# Patient Record
Sex: FEMALE | Race: WHITE | NOT HISPANIC OR LATINO | URBAN - METROPOLITAN AREA
[De-identification: names, ages, dates, MRNs, and addresses within clinical notes are randomized per-mention and may not be internally consistent; named-entity substitution may affect disease eponyms.]

---

## 2017-07-10 ENCOUNTER — INPATIENT (INPATIENT)
Facility: HOSPITAL | Age: 82
LOS: 2 days | Discharge: HOSPICE | DRG: 177 | End: 2017-07-13
Attending: INTERNAL MEDICINE | Admitting: HOSPITALIST
Payer: MEDICARE

## 2017-07-10 VITALS
RESPIRATION RATE: 16 BRPM | TEMPERATURE: 98 F | SYSTOLIC BLOOD PRESSURE: 108 MMHG | HEIGHT: 64 IN | WEIGHT: 110.01 LBS | DIASTOLIC BLOOD PRESSURE: 70 MMHG | HEART RATE: 80 BPM | OXYGEN SATURATION: 94 %

## 2017-07-10 DIAGNOSIS — K52.9 NONINFECTIVE GASTROENTERITIS AND COLITIS, UNSPECIFIED: ICD-10-CM

## 2017-07-10 DIAGNOSIS — J15.9 UNSPECIFIED BACTERIAL PNEUMONIA: ICD-10-CM

## 2017-07-10 DIAGNOSIS — L89.154 PRESSURE ULCER OF SACRAL REGION, STAGE 4: ICD-10-CM

## 2017-07-10 DIAGNOSIS — N30.00 ACUTE CYSTITIS WITHOUT HEMATURIA: ICD-10-CM

## 2017-07-10 DIAGNOSIS — L98.8 OTHER SPECIFIED DISORDERS OF THE SKIN AND SUBCUTANEOUS TISSUE: ICD-10-CM

## 2017-07-10 DIAGNOSIS — K63.2 FISTULA OF INTESTINE: ICD-10-CM

## 2017-07-10 DIAGNOSIS — D47.3 ESSENTIAL (HEMORRHAGIC) THROMBOCYTHEMIA: ICD-10-CM

## 2017-07-10 DIAGNOSIS — E87.1 HYPO-OSMOLALITY AND HYPONATREMIA: ICD-10-CM

## 2017-07-10 DIAGNOSIS — Z93.1 GASTROSTOMY STATUS: Chronic | ICD-10-CM

## 2017-07-10 DIAGNOSIS — Z29.9 ENCOUNTER FOR PROPHYLACTIC MEASURES, UNSPECIFIED: ICD-10-CM

## 2017-07-10 DIAGNOSIS — K56.41 FECAL IMPACTION: ICD-10-CM

## 2017-07-10 LAB
ALBUMIN SERPL ELPH-MCNC: 2.5 G/DL — LOW (ref 3.3–5)
ALP SERPL-CCNC: 445 U/L — HIGH (ref 40–120)
ALT FLD-CCNC: 85 U/L — HIGH (ref 12–78)
ANION GAP SERPL CALC-SCNC: 5 MMOL/L — SIGNIFICANT CHANGE UP (ref 5–17)
APPEARANCE UR: ABNORMAL
APTT BLD: 30.8 SEC — SIGNIFICANT CHANGE UP (ref 27.5–37.4)
AST SERPL-CCNC: 97 U/L — HIGH (ref 15–37)
BASOPHILS # BLD AUTO: 0 K/UL — SIGNIFICANT CHANGE UP (ref 0–0.2)
BASOPHILS NFR BLD AUTO: 0.2 % — SIGNIFICANT CHANGE UP (ref 0–2)
BILIRUB SERPL-MCNC: 0.3 MG/DL — SIGNIFICANT CHANGE UP (ref 0.2–1.2)
BILIRUB UR-MCNC: NEGATIVE — SIGNIFICANT CHANGE UP
BUN SERPL-MCNC: 44 MG/DL — HIGH (ref 7–23)
CALCIUM SERPL-MCNC: 8.9 MG/DL — SIGNIFICANT CHANGE UP (ref 8.5–10.1)
CHLORIDE SERPL-SCNC: 93 MMOL/L — LOW (ref 96–108)
CO2 SERPL-SCNC: 31 MMOL/L — SIGNIFICANT CHANGE UP (ref 22–31)
COLOR SPEC: ABNORMAL
CREAT SERPL-MCNC: 0.48 MG/DL — LOW (ref 0.5–1.3)
DIFF PNL FLD: ABNORMAL
EOSINOPHIL # BLD AUTO: 0 K/UL — SIGNIFICANT CHANGE UP (ref 0–0.5)
EOSINOPHIL NFR BLD AUTO: 0.1 % — SIGNIFICANT CHANGE UP (ref 0–6)
GLUCOSE SERPL-MCNC: 85 MG/DL — SIGNIFICANT CHANGE UP (ref 70–99)
GLUCOSE UR QL: NEGATIVE — SIGNIFICANT CHANGE UP
HCT VFR BLD CALC: 33.1 % — LOW (ref 34.5–45)
HGB BLD-MCNC: 11.3 G/DL — LOW (ref 11.5–15.5)
INR BLD: 1.14 RATIO — SIGNIFICANT CHANGE UP (ref 0.88–1.16)
KETONES UR-MCNC: NEGATIVE — SIGNIFICANT CHANGE UP
LACTATE SERPL-SCNC: 0.8 MMOL/L — SIGNIFICANT CHANGE UP (ref 0.7–2)
LEUKOCYTE ESTERASE UR-ACNC: ABNORMAL
LIDOCAIN IGE QN: 82 U/L — SIGNIFICANT CHANGE UP (ref 73–393)
LYMPHOCYTES # BLD AUTO: 1 K/UL — SIGNIFICANT CHANGE UP (ref 1–3.3)
LYMPHOCYTES # BLD AUTO: 13.7 % — SIGNIFICANT CHANGE UP (ref 13–44)
MCHC RBC-ENTMCNC: 32 PG — SIGNIFICANT CHANGE UP (ref 27–34)
MCHC RBC-ENTMCNC: 34.2 GM/DL — SIGNIFICANT CHANGE UP (ref 32–36)
MCV RBC AUTO: 93.8 FL — SIGNIFICANT CHANGE UP (ref 80–100)
MONOCYTES # BLD AUTO: 0.4 K/UL — SIGNIFICANT CHANGE UP (ref 0–0.9)
MONOCYTES NFR BLD AUTO: 5.8 % — SIGNIFICANT CHANGE UP (ref 1–9)
NEUTROPHILS # BLD AUTO: 5.7 K/UL — SIGNIFICANT CHANGE UP (ref 1.8–7.4)
NEUTROPHILS NFR BLD AUTO: 80.1 % — HIGH (ref 43–77)
NITRITE UR-MCNC: NEGATIVE — SIGNIFICANT CHANGE UP
PH UR: 9 — HIGH (ref 5–8)
PLATELET # BLD AUTO: 407 K/UL — HIGH (ref 150–400)
POTASSIUM SERPL-MCNC: 4.1 MMOL/L — SIGNIFICANT CHANGE UP (ref 3.5–5.3)
POTASSIUM SERPL-SCNC: 4.1 MMOL/L — SIGNIFICANT CHANGE UP (ref 3.5–5.3)
PROT SERPL-MCNC: 7.3 G/DL — SIGNIFICANT CHANGE UP (ref 6–8.3)
PROT UR-MCNC: 500 MG/DL
PROTHROM AB SERPL-ACNC: 12.5 SEC — SIGNIFICANT CHANGE UP (ref 9.8–12.7)
RBC # BLD: 3.53 M/UL — LOW (ref 3.8–5.2)
RBC # FLD: 12.3 % — SIGNIFICANT CHANGE UP (ref 10.3–14.5)
SODIUM SERPL-SCNC: 129 MMOL/L — LOW (ref 135–145)
SP GR SPEC: 1.02 — SIGNIFICANT CHANGE UP (ref 1.01–1.02)
UROBILINOGEN FLD QL: NEGATIVE — SIGNIFICANT CHANGE UP
WBC # BLD: 7.2 K/UL — SIGNIFICANT CHANGE UP (ref 3.8–10.5)
WBC # FLD AUTO: 7.2 K/UL — SIGNIFICANT CHANGE UP (ref 3.8–10.5)

## 2017-07-10 PROCEDURE — 99285 EMERGENCY DEPT VISIT HI MDM: CPT

## 2017-07-10 PROCEDURE — 74176 CT ABD & PELVIS W/O CONTRAST: CPT | Mod: 26

## 2017-07-10 PROCEDURE — 93010 ELECTROCARDIOGRAM REPORT: CPT

## 2017-07-10 PROCEDURE — 71010: CPT | Mod: 26

## 2017-07-10 PROCEDURE — 99223 1ST HOSP IP/OBS HIGH 75: CPT | Mod: AI

## 2017-07-10 RX ORDER — AMLODIPINE BESYLATE 2.5 MG/1
5 TABLET ORAL DAILY
Qty: 0 | Refills: 0 | Status: DISCONTINUED | OUTPATIENT
Start: 2017-07-10 | End: 2017-07-13

## 2017-07-10 RX ORDER — CARBIDOPA AND LEVODOPA 25; 100 MG/1; MG/1
1 TABLET ORAL
Qty: 0 | Refills: 0 | Status: DISCONTINUED | OUTPATIENT
Start: 2017-07-10 | End: 2017-07-13

## 2017-07-10 RX ORDER — VANCOMYCIN HCL 1 G
VIAL (EA) INTRAVENOUS
Qty: 0 | Refills: 0 | Status: DISCONTINUED | OUTPATIENT
Start: 2017-07-10 | End: 2017-07-12

## 2017-07-10 RX ORDER — VANCOMYCIN HCL 1 G
1000 VIAL (EA) INTRAVENOUS ONCE
Qty: 0 | Refills: 0 | Status: COMPLETED | OUTPATIENT
Start: 2017-07-10 | End: 2017-07-10

## 2017-07-10 RX ORDER — OXYCODONE HYDROCHLORIDE 5 MG/1
5 TABLET ORAL EVERY 4 HOURS
Qty: 0 | Refills: 0 | Status: DISCONTINUED | OUTPATIENT
Start: 2017-07-10 | End: 2017-07-13

## 2017-07-10 RX ORDER — VANCOMYCIN HCL 1 G
1000 VIAL (EA) INTRAVENOUS EVERY 12 HOURS
Qty: 0 | Refills: 0 | Status: DISCONTINUED | OUTPATIENT
Start: 2017-07-11 | End: 2017-07-12

## 2017-07-10 RX ORDER — PIPERACILLIN AND TAZOBACTAM 4; .5 G/20ML; G/20ML
3.38 INJECTION, POWDER, LYOPHILIZED, FOR SOLUTION INTRAVENOUS ONCE
Qty: 0 | Refills: 0 | Status: COMPLETED | OUTPATIENT
Start: 2017-07-10 | End: 2017-07-10

## 2017-07-10 RX ORDER — PANTOPRAZOLE SODIUM 20 MG/1
40 TABLET, DELAYED RELEASE ORAL DAILY
Qty: 0 | Refills: 0 | Status: DISCONTINUED | OUTPATIENT
Start: 2017-07-10 | End: 2017-07-13

## 2017-07-10 RX ORDER — FERROUS SULFATE 325(65) MG
300 TABLET ORAL DAILY
Qty: 0 | Refills: 0 | Status: DISCONTINUED | OUTPATIENT
Start: 2017-07-10 | End: 2017-07-13

## 2017-07-10 RX ORDER — PIPERACILLIN AND TAZOBACTAM 4; .5 G/20ML; G/20ML
3.38 INJECTION, POWDER, LYOPHILIZED, FOR SOLUTION INTRAVENOUS EVERY 8 HOURS
Qty: 0 | Refills: 0 | Status: DISCONTINUED | OUTPATIENT
Start: 2017-07-10 | End: 2017-07-13

## 2017-07-10 RX ORDER — SODIUM CHLORIDE 9 MG/ML
1000 INJECTION INTRAMUSCULAR; INTRAVENOUS; SUBCUTANEOUS
Qty: 0 | Refills: 0 | Status: DISCONTINUED | OUTPATIENT
Start: 2017-07-10 | End: 2017-07-12

## 2017-07-10 RX ORDER — ASPIRIN/CALCIUM CARB/MAGNESIUM 324 MG
81 TABLET ORAL DAILY
Qty: 0 | Refills: 0 | Status: DISCONTINUED | OUTPATIENT
Start: 2017-07-10 | End: 2017-07-13

## 2017-07-10 RX ORDER — LEVOTHYROXINE SODIUM 125 MCG
50 TABLET ORAL DAILY
Qty: 0 | Refills: 0 | Status: DISCONTINUED | OUTPATIENT
Start: 2017-07-10 | End: 2017-07-13

## 2017-07-10 RX ORDER — CEFTRIAXONE 500 MG/1
1 INJECTION, POWDER, FOR SOLUTION INTRAMUSCULAR; INTRAVENOUS ONCE
Qty: 0 | Refills: 0 | Status: COMPLETED | OUTPATIENT
Start: 2017-07-10 | End: 2017-07-10

## 2017-07-10 RX ADMIN — PIPERACILLIN AND TAZOBACTAM 200 GRAM(S): 4; .5 INJECTION, POWDER, LYOPHILIZED, FOR SOLUTION INTRAVENOUS at 23:01

## 2017-07-10 RX ADMIN — CEFTRIAXONE 100 GRAM(S): 500 INJECTION, POWDER, FOR SOLUTION INTRAMUSCULAR; INTRAVENOUS at 19:47

## 2017-07-10 RX ADMIN — Medication 250 MILLIGRAM(S): at 23:48

## 2017-07-10 RX ADMIN — SODIUM CHLORIDE 100 MILLILITER(S): 9 INJECTION INTRAMUSCULAR; INTRAVENOUS; SUBCUTANEOUS at 23:02

## 2017-07-10 NOTE — ED ADULT NURSE NOTE - PMH
Aphasia    Cerebral infarction    Dysarthria    Dysphagia    Gastrostomy in place    Hemiparesis    Hemiplegia    HTN (hypertension)    Hypothyroidism    Parkinson disease

## 2017-07-10 NOTE — ED PROVIDER NOTE - OBJECTIVE STATEMENT
80 yo female hx of aphasia/CVA, g-tube, parkinson's sent from [facility] to r/o colon/bladder fistula as per facility note.  Urine was obtained at facility via straight cath, color was dark olive green color with the smell of feces.  Sent here for CT a/p.  Full code as per MOLST form.  PMD at facility Dr. Ace.

## 2017-07-10 NOTE — H&P ADULT - ASSESSMENT
80 YO female with mult comorbidities sent to ED from SNF for fecoid urine. CT with suspected PNA, impaction, colitis, and uretovesicular fistula.

## 2017-07-10 NOTE — ED PROVIDER NOTE - CARE PLAN
Principal Discharge DX:	Entero-colic fistula  Secondary Diagnosis:	Urinary tract infection without hematuria, site unspecified

## 2017-07-10 NOTE — H&P ADULT - HISTORY OF PRESENT ILLNESS
80 YO F PMH Parkinsons, dysphagia, PEG, stage IV decubitus, sent to ED from [facility] for stool visualized in urine. Pt aphasic/non verbal, records from ED and SNF reviewed and family called ( awaiting callback).  In ED, CT ABD pelvis perfomed and interpreted with findings of bilar infiltrates, fecal impaction, colitis and fistula within bladder.

## 2017-07-10 NOTE — H&P ADULT - ATTENDING COMMENTS
seen examined and hP authored by attending  MD in Emergency dept. Attempted to contact pt  Mr Frank Cespedes, with no success. Will continue to try to locate additional numbers from SNF.     CORDELL reviewed, full resuscitation. seen examined and hP authored by attending  MD in Emergency dept. Attempted to contact pt  Mr Frank Salazar, with no success. Will continue to try to locate additional numbers from SNF.     Addendum: spoke to pt son Denys Salazar ( Frank Monteiro IV), voicing understanding and agreement.     CORDELL reviewed, full resuscitation. seen examined and hP authored by attending  MD in Emergency dept.    spoke to pt son Denys Salazar ( Frank Monteiro IV), and  Frank Salazar III via telephone. Both voicing understanding and agreement.     CORDELL reviewed, full resuscitation.

## 2017-07-10 NOTE — H&P ADULT - NSHPPHYSICALEXAM_GEN_ALL_CORE
General: Frail elderly female , NAD  HEENT: NCAT, PERRLA, EOMI bl, moist mucous membranes   Neck: Supple, nontender, no mass  Neurology: awake nion verbal unable to follow commands  Respiratory: rhonchi RLB  CV: RRR, +S1/S2, no murmurs, rubs or gallops  Abdominal: Soft, PEG in place, nursing present as chaperone for rectal exam: good tone, moderate green stool, disimpacted large volume. sent for occult blood.   : zamorano with 100 cc brown urine with foul smell neg SPT or CVA tenderness  Extremities:contracted no edema  Skin: warm, dry, full thickness ulcer unstageable to Right superior buttocks just lateral to superior portion of sacrum. measures 3-4CM/

## 2017-07-10 NOTE — H&P ADULT - NSHPLABSRESULTS_GEN_ALL_CORE
CT ABD There is bibasilar airspace consolidation, which may reflect pneumonia   in the appropriate clinical setting.  Poorly defined centrilobular opacities are noted at the right lung base,   likely reflecting smallairway disease.ercutaneous gastrostomy tube is located within the stomach.  The evaluation of the stomach is limited, nondistended.  There is fecal impaction with distention of the rectosigmoid colon. There   is circumferential bowel wall thickening. Findings may be associated with   a stercoral colitis.  Nosmall bowel bowel obstruction is noted.    No localized intra-abdominal fluid collection or pneumoperitoneum is   noted.    Streak artifact from patient's left hip arthroplasty degrades image   quality limiting evaluation of the pelvis.    The uterusappears displaced to the right pelvis.  There is an approximately 3 cm right adnexal cyst.    There are multilevel degenerative changes of the lumbar spine with a   scoliosis convex curvature to the right.  There are degenerative changes at the righthip joint.    Impression:    Fecal impaction with thick-walled distended rectosigmoid colon, correlate   for stercoral colitis.    Large amount of intraluminal air within the thickened urinary bladder,   correlate for an emphysematous cystitis or enteric fistula formation.                            11.3   7.2   )-----------( 407      ( 10 Jul 2017 18:20 )             33.1       07-10    129<L>  |  93<L>  |  44<H>  ----------------------------<  85  4.1   |  31  |  0.48<L>    Ca    8.9      10 Jul 2017 18:20    TPro  7.3  /  Alb  2.5<L>  /  TBili  0.3  /  DBili  x   /  AST  97<H>  /  ALT  85<H>  /  AlkPhos  445<H>  07-10              Urinalysis Basic - ( 10 Jul 2017 18:20 )    Color: Green / Appearance: Turbid / S.020 / pH: x  Gluc: x / Ketone: Negative  / Bili: Negative / Urobili: Negative   Blood: x / Protein: 500 mg/dL / Nitrite: Negative   Leuk Esterase: Small / RBC: 11-25 /HPF / WBC 11-25   Sq Epi: x / Non Sq Epi: x / Bacteria: Moderate        PT/INR - ( 10 Jul 2017 18:20 )   PT: 12.5 sec;   INR: 1.14 ratio         PTT - ( 10 Jul 2017 18:20 )  PTT:30.8 sec    Lactate Trend  07-10 @ 18:24 Lactate:0.8

## 2017-07-11 DIAGNOSIS — I69.354 HEMIPLEGIA AND HEMIPARESIS FOLLOWING CEREBRAL INFARCTION AFFECTING LEFT NON-DOMINANT SIDE: ICD-10-CM

## 2017-07-11 DIAGNOSIS — A41.9 SEPSIS, UNSPECIFIED ORGANISM: ICD-10-CM

## 2017-07-11 DIAGNOSIS — G93.41 METABOLIC ENCEPHALOPATHY: ICD-10-CM

## 2017-07-11 DIAGNOSIS — D62 ACUTE POSTHEMORRHAGIC ANEMIA: ICD-10-CM

## 2017-07-11 DIAGNOSIS — E87.0 HYPEROSMOLALITY AND HYPERNATREMIA: ICD-10-CM

## 2017-07-11 DIAGNOSIS — Z79.891 LONG TERM (CURRENT) USE OF OPIATE ANALGESIC: ICD-10-CM

## 2017-07-11 DIAGNOSIS — Z93.1 GASTROSTOMY STATUS: ICD-10-CM

## 2017-07-11 DIAGNOSIS — K29.71 GASTRITIS, UNSPECIFIED, WITH BLEEDING: ICD-10-CM

## 2017-07-11 DIAGNOSIS — E87.6 HYPOKALEMIA: ICD-10-CM

## 2017-07-11 DIAGNOSIS — E86.0 DEHYDRATION: ICD-10-CM

## 2017-07-11 DIAGNOSIS — K94.23 GASTROSTOMY MALFUNCTION: ICD-10-CM

## 2017-07-11 DIAGNOSIS — I10 ESSENTIAL (PRIMARY) HYPERTENSION: ICD-10-CM

## 2017-07-11 DIAGNOSIS — E03.9 HYPOTHYROIDISM, UNSPECIFIED: ICD-10-CM

## 2017-07-11 DIAGNOSIS — K63.2 FISTULA OF INTESTINE: ICD-10-CM

## 2017-07-11 DIAGNOSIS — K62.89 OTHER SPECIFIED DISEASES OF ANUS AND RECTUM: ICD-10-CM

## 2017-07-11 DIAGNOSIS — Z79.82 LONG TERM (CURRENT) USE OF ASPIRIN: ICD-10-CM

## 2017-07-11 DIAGNOSIS — F03.90 UNSPECIFIED DEMENTIA, UNSPECIFIED SEVERITY, WITHOUT BEHAVIORAL DISTURBANCE, PSYCHOTIC DISTURBANCE, MOOD DISTURBANCE, AND ANXIETY: ICD-10-CM

## 2017-07-11 DIAGNOSIS — G20 PARKINSON'S DISEASE: ICD-10-CM

## 2017-07-11 DIAGNOSIS — N39.0 URINARY TRACT INFECTION, SITE NOT SPECIFIED: ICD-10-CM

## 2017-07-11 LAB
ANION GAP SERPL CALC-SCNC: 6 MMOL/L — SIGNIFICANT CHANGE UP (ref 5–17)
ANION GAP SERPL CALC-SCNC: 8 MMOL/L — SIGNIFICANT CHANGE UP (ref 5–17)
BASOPHILS # BLD AUTO: 0 K/UL — SIGNIFICANT CHANGE UP (ref 0–0.2)
BASOPHILS NFR BLD AUTO: 0.4 % — SIGNIFICANT CHANGE UP (ref 0–2)
BUN SERPL-MCNC: 32 MG/DL — HIGH (ref 7–23)
BUN SERPL-MCNC: 38 MG/DL — HIGH (ref 7–23)
CALCIUM SERPL-MCNC: 8.4 MG/DL — LOW (ref 8.5–10.1)
CALCIUM SERPL-MCNC: 8.7 MG/DL — SIGNIFICANT CHANGE UP (ref 8.5–10.1)
CHLORIDE SERPL-SCNC: 91 MMOL/L — LOW (ref 96–108)
CHLORIDE SERPL-SCNC: 91 MMOL/L — LOW (ref 96–108)
CO2 SERPL-SCNC: 30 MMOL/L — SIGNIFICANT CHANGE UP (ref 22–31)
CO2 SERPL-SCNC: 31 MMOL/L — SIGNIFICANT CHANGE UP (ref 22–31)
CREAT SERPL-MCNC: 0.35 MG/DL — LOW (ref 0.5–1.3)
CREAT SERPL-MCNC: 0.43 MG/DL — LOW (ref 0.5–1.3)
CULTURE RESULTS: SIGNIFICANT CHANGE UP
EOSINOPHIL # BLD AUTO: 0 K/UL — SIGNIFICANT CHANGE UP (ref 0–0.5)
EOSINOPHIL NFR BLD AUTO: 0.1 % — SIGNIFICANT CHANGE UP (ref 0–6)
GLUCOSE SERPL-MCNC: 76 MG/DL — SIGNIFICANT CHANGE UP (ref 70–99)
GLUCOSE SERPL-MCNC: 83 MG/DL — SIGNIFICANT CHANGE UP (ref 70–99)
HCT VFR BLD CALC: 31.3 % — LOW (ref 34.5–45)
HGB BLD-MCNC: 10.7 G/DL — LOW (ref 11.5–15.5)
LYMPHOCYTES # BLD AUTO: 0.9 K/UL — LOW (ref 1–3.3)
LYMPHOCYTES # BLD AUTO: 13.8 % — SIGNIFICANT CHANGE UP (ref 13–44)
MCHC RBC-ENTMCNC: 31.9 PG — SIGNIFICANT CHANGE UP (ref 27–34)
MCHC RBC-ENTMCNC: 34.1 GM/DL — SIGNIFICANT CHANGE UP (ref 32–36)
MCV RBC AUTO: 93.6 FL — SIGNIFICANT CHANGE UP (ref 80–100)
MONOCYTES # BLD AUTO: 0.3 K/UL — SIGNIFICANT CHANGE UP (ref 0–0.9)
MONOCYTES NFR BLD AUTO: 5 % — SIGNIFICANT CHANGE UP (ref 1–9)
NEUTROPHILS # BLD AUTO: 5 K/UL — SIGNIFICANT CHANGE UP (ref 1.8–7.4)
NEUTROPHILS NFR BLD AUTO: 80.8 % — HIGH (ref 43–77)
PLATELET # BLD AUTO: 361 K/UL — SIGNIFICANT CHANGE UP (ref 150–400)
POTASSIUM SERPL-MCNC: 3.6 MMOL/L — SIGNIFICANT CHANGE UP (ref 3.5–5.3)
POTASSIUM SERPL-MCNC: 3.8 MMOL/L — SIGNIFICANT CHANGE UP (ref 3.5–5.3)
POTASSIUM SERPL-SCNC: 3.6 MMOL/L — SIGNIFICANT CHANGE UP (ref 3.5–5.3)
POTASSIUM SERPL-SCNC: 3.8 MMOL/L — SIGNIFICANT CHANGE UP (ref 3.5–5.3)
RBC # BLD: 3.35 M/UL — LOW (ref 3.8–5.2)
RBC # FLD: 12.2 % — SIGNIFICANT CHANGE UP (ref 10.3–14.5)
SODIUM SERPL-SCNC: 128 MMOL/L — LOW (ref 135–145)
SODIUM SERPL-SCNC: 129 MMOL/L — LOW (ref 135–145)
SPECIMEN SOURCE: SIGNIFICANT CHANGE UP
WBC # BLD: 6.2 K/UL — SIGNIFICANT CHANGE UP (ref 3.8–10.5)
WBC # FLD AUTO: 6.2 K/UL — SIGNIFICANT CHANGE UP (ref 3.8–10.5)

## 2017-07-11 PROCEDURE — 99233 SBSQ HOSP IP/OBS HIGH 50: CPT

## 2017-07-11 PROCEDURE — 49465 FLUORO EXAM OF G/COLON TUBE: CPT

## 2017-07-11 RX ORDER — ACETAMINOPHEN 500 MG
650 TABLET ORAL EVERY 6 HOURS
Qty: 0 | Refills: 0 | Status: DISCONTINUED | OUTPATIENT
Start: 2017-07-11 | End: 2017-07-13

## 2017-07-11 RX ORDER — ACETAMINOPHEN 500 MG
650 TABLET ORAL EVERY 6 HOURS
Qty: 0 | Refills: 0 | Status: DISCONTINUED | OUTPATIENT
Start: 2017-07-11 | End: 2017-07-11

## 2017-07-11 RX ORDER — DIATRIZOATE MEGLUMINE 180 MG/ML
50 INJECTION, SOLUTION INTRAVESICAL ONCE
Qty: 0 | Refills: 0 | Status: COMPLETED | OUTPATIENT
Start: 2017-07-11 | End: 2017-07-11

## 2017-07-11 RX ADMIN — AMLODIPINE BESYLATE 5 MILLIGRAM(S): 2.5 TABLET ORAL at 05:27

## 2017-07-11 RX ADMIN — CARBIDOPA AND LEVODOPA 1 TABLET(S): 25; 100 TABLET ORAL at 05:27

## 2017-07-11 RX ADMIN — OXYCODONE HYDROCHLORIDE 5 MILLIGRAM(S): 5 TABLET ORAL at 11:21

## 2017-07-11 RX ADMIN — PIPERACILLIN AND TAZOBACTAM 25 GRAM(S): 4; .5 INJECTION, POWDER, LYOPHILIZED, FOR SOLUTION INTRAVENOUS at 13:43

## 2017-07-11 RX ADMIN — Medication 50 MICROGRAM(S): at 05:27

## 2017-07-11 RX ADMIN — Medication 300 MILLIGRAM(S): at 13:43

## 2017-07-11 RX ADMIN — PANTOPRAZOLE SODIUM 40 MILLIGRAM(S): 20 TABLET, DELAYED RELEASE ORAL at 13:44

## 2017-07-11 RX ADMIN — PIPERACILLIN AND TAZOBACTAM 25 GRAM(S): 4; .5 INJECTION, POWDER, LYOPHILIZED, FOR SOLUTION INTRAVENOUS at 05:28

## 2017-07-11 RX ADMIN — Medication 250 MILLIGRAM(S): at 10:21

## 2017-07-11 RX ADMIN — SODIUM CHLORIDE 100 MILLILITER(S): 9 INJECTION INTRAMUSCULAR; INTRAVENOUS; SUBCUTANEOUS at 10:21

## 2017-07-11 RX ADMIN — SODIUM CHLORIDE 100 MILLILITER(S): 9 INJECTION INTRAMUSCULAR; INTRAVENOUS; SUBCUTANEOUS at 22:38

## 2017-07-11 RX ADMIN — OXYCODONE HYDROCHLORIDE 5 MILLIGRAM(S): 5 TABLET ORAL at 05:28

## 2017-07-11 RX ADMIN — PIPERACILLIN AND TAZOBACTAM 25 GRAM(S): 4; .5 INJECTION, POWDER, LYOPHILIZED, FOR SOLUTION INTRAVENOUS at 22:37

## 2017-07-11 RX ADMIN — Medication 81 MILLIGRAM(S): at 13:44

## 2017-07-11 RX ADMIN — OXYCODONE HYDROCHLORIDE 5 MILLIGRAM(S): 5 TABLET ORAL at 10:21

## 2017-07-11 RX ADMIN — OXYCODONE HYDROCHLORIDE 5 MILLIGRAM(S): 5 TABLET ORAL at 13:53

## 2017-07-11 RX ADMIN — OXYCODONE HYDROCHLORIDE 5 MILLIGRAM(S): 5 TABLET ORAL at 14:53

## 2017-07-11 RX ADMIN — DIATRIZOATE MEGLUMINE 50 MILLILITER(S): 180 INJECTION, SOLUTION INTRAVESICAL at 18:14

## 2017-07-11 NOTE — CONSULT NOTE ADULT - SUBJECTIVE AND OBJECTIVE BOX
Chief Complaint:  Patient is a 81y old  Female who presents with a chief complaint of Fistula of intestine (2017 00:56)      HPI: 80 yo female with a rectovesicular fistula found to have a malfunctioned peg tube. Patient is non verbal, minimally responsive at bedside.     Allergies:  No Known Allergies      Medications:  aspirin  chewable 81 milliGRAM(s) Oral daily  oxyCODONE    IR 5 milliGRAM(s) Oral every 4 hours  carbidopa/levodopa  25/100 1 Tablet(s) Oral two times a day  amLODIPine   Tablet 5 milliGRAM(s) Oral daily  ferrous    sulfate Liquid 300 milliGRAM(s) Enteral Tube daily  pantoprazole   Suspension 40 milliGRAM(s) Oral daily  levothyroxine 50 MICROGram(s) Oral daily  sodium chloride 0.9%. 1000 milliLiter(s) IV Continuous <Continuous>  vancomycin  IVPB   IV Intermittent   piperacillin/tazobactam IVPB. 3.375 Gram(s) IV Intermittent every 8 hours  vancomycin  IVPB 1000 milliGRAM(s) IV Intermittent every 12 hours  diatrizoate meglumine/diatrizoate sodium 50 milliLiter(s) Enteral Tube once      PMHX/PSHX:  HTN (hypertension)  Hypothyroidism  Hemiparesis  Hemiplegia  Cerebral infarction  Aphasia  Dysarthria  Gastrostomy in place  Dysphagia  Parkinson disease  S/P percutaneous endoscopic gastrostomy (PEG) tube placement  No significant past surgical history      Family history:  No pertinent family history in first degree relatives      Social History: unknown      PHYSICAL EXAM:   Vital Signs:  Vital Signs Last 24 Hrs  T(C): 37.2 (2017 16:55), Max: 37.9 (2017 08:00)  T(F): 99 (2017 16:55), Max: 100.2 (2017 08:00)  HR: 84 (2017 16:55) (78 - 98)  BP: 110/65 (2017 16:55) (98/60 - 126/72)  BP(mean): --  RR: 16 (2017 16:55) (16 - 20)  SpO2: 93% (2017 16:55) (93% - 100%)  Daily Height in cm: 162.56 (10 Jul 2017 17:10)    Daily Weight in k.4 (2017 08:57)    GENERAL:  alert, non verbal, minimally responsive  HEENT:  NC/AT,  conjunctivae clear and pink, no thyromegaly, nodules, adenopathy, no JVD, sclera -anicteric  CHEST:  Full & symmetric excursion, no increased effort, breath sounds clear  HEART:  Regular rhythm, S1, S2, no murmur/rub/S3/S4, no abdominal bruit, no edema  ABDOMEN:  Soft, non-tender, non-distended, normoactive bowel sounds,  (+) peg tube  EXTEREMITIES:  + contractures of rue  SKIN:  No rash/erythema/ecchymoses/petechiae/wounds/abscess/warm/dry  NEURO:  Alert, oriented, no asterixis, no tremor, no encephalopathy    LABS:                        10.7   6.2   )-----------( 361      ( 10 Jul 2017 23:54 )             31.3     07-11    129<L>  |  91<L>  |  32<H>  ----------------------------<  76  3.8   |  30  |  0.35<L>    Ca    8.4<L>      2017 02:54    TPro  7.3  /  Alb  2.5<L>  /  TBili  0.3  /  DBili  x   /  AST  97<H>  /  ALT  85<H>  /  AlkPhos  445<H>  0710    LIVER FUNCTIONS - ( 10 Jul 2017 18:20 )  Alb: 2.5 g/dL / Pro: 7.3 g/dL / ALK PHOS: 445 U/L / ALT: 85 U/L / AST: 97 U/L / GGT: x           PT/INR - ( 10 Jul 2017 18:20 )   PT: 12.5 sec;   INR: 1.14 ratio         PTT - ( 10 Jul 2017 18:20 )  PTT:30.8 sec  Urinalysis Basic - ( 10 Jul 2017 18:20 )    Color: Green / Appearance: Turbid / S.020 / pH: x  Gluc: x / Ketone: Negative  / Bili: Negative / Urobili: Negative   Blood: x / Protein: 500 mg/dL / Nitrite: Negative   Leuk Esterase: Small / RBC: 11-25 /HPF / WBC 11-25   Sq Epi: x / Non Sq Epi: x / Bacteria: Moderate      Amylase Serum--      Lipase serum82       Ammonia--      Imaging:

## 2017-07-11 NOTE — CONSULT NOTE ADULT - PROBLEM SELECTOR RECOMMENDATION 9
exchanged at bedside with a replacement 20 Vietnamese G tube  no complications noted  1 ml blood loss  f/u gastroview study to confirm placement

## 2017-07-11 NOTE — PROGRESS NOTE ADULT - SUBJECTIVE AND OBJECTIVE BOX
d/w pt's  and grandson regarding care.  At this time, willing to do diverting colostomy for pt.  Not a candidate for resection.  Want family to talk with palliative and hospice prior to procedure due to pt's quality of life.

## 2017-07-11 NOTE — CONSULT NOTE ADULT - SUBJECTIVE AND OBJECTIVE BOX
pt seen and examined    full consultation dictated       80 yo presents with fecaluria, and stercoral colitis.  Dismpacted in ER.   PT noncommunicate, s/p CVA, parkinson.  Asymptomatic with no signs of infection       -KUB       -enemas       -will d/w team/ family regarding care

## 2017-07-11 NOTE — DIETITIAN INITIAL EVALUATION ADULT. - OTHER INFO
patient from [facility] on vital 1.5 feeding via PEG. patient aphasic not interviewed.

## 2017-07-11 NOTE — PROGRESS NOTE ADULT - SUBJECTIVE AND OBJECTIVE BOX
Patient is a 81y old  Female who presents with a chief complaint of Fistula of intestine (2017 00:56)      INTERVAL HPI/OVERNIGHT EVENTS: cannot obtain ROS as pt is nonverbal with advanced dementia.    MEDICATIONS  (STANDING):  aspirin  chewable 81 milliGRAM(s) Oral daily  oxyCODONE    IR 5 milliGRAM(s) Oral every 4 hours  carbidopa/levodopa  25/100 1 Tablet(s) Oral two times a day  amLODIPine   Tablet 5 milliGRAM(s) Oral daily  ferrous    sulfate Liquid 300 milliGRAM(s) Enteral Tube daily  pantoprazole   Suspension 40 milliGRAM(s) Oral daily  levothyroxine 50 MICROGram(s) Oral daily  sodium chloride 0.9%. 1000 milliLiter(s) (100 mL/Hr) IV Continuous <Continuous>  vancomycin  IVPB   IV Intermittent   piperacillin/tazobactam IVPB. 3.375 Gram(s) IV Intermittent every 8 hours  vancomycin  IVPB 1000 milliGRAM(s) IV Intermittent every 12 hours    MEDICATIONS  (PRN):  acetaminophen    Suspension 650 milliGRAM(s) Oral every 6 hours PRN For Temp greater than 38 C (100.4 F)      Allergies    No Known Allergies    Intolerances        REVIEW OF SYSTEMS:  Cannot obtain as pt has advanced dementia and is non-verbal    Vital Signs Last 24 Hrs  T(C): 38 (2017 02:30), Max: 38.9 (2017 23:12)  T(F): 100.4 (2017 02:30), Max: 102 (2017 23:12)  HR: 84 (2017 23:12) (84 - 84)  BP: 114/61 (2017 23:12) (98/60 - 114/61)  BP(mean): --  RR: 20 (2017 23:12) (16 - 20)  SpO2: 92% (2017 23:12) (92% - 97%)    PHYSICAL EXAM:  GENERAL: frail, chronically-ill appearing  HEENT:  mmm  CHEST/LUNG:  grossly CTA b/l  HEART:  irregular, S1, S2  ABDOMEN:  BS+, soft, nontender, nondistended; +PEG  : zamorano draining urine with fecal greenish color  EXTREMITIES: no edema or calf tenderness  SKIN:  no rash  NERVOUS SYSTEM: nonverbal, does not open eyes to voice, reacts to painful stimuli. does not follow commands    LABS:    CBC Full  -  ( 10 Jul 2017 23:54 )  WBC Count : 6.2 K/uL  Hemoglobin : 10.7 g/dL  Hematocrit : 31.3 %  Platelet Count - Automated : 361 K/uL  Mean Cell Volume : 93.6 fl  Mean Cell Hemoglobin : 31.9 pg  Mean Cell Hemoglobin Concentration : 34.1 gm/dL  Auto Neutrophil # : 5.0 K/uL  Auto Lymphocyte # : 0.9 K/uL  Auto Monocyte # : 0.3 K/uL  Auto Eosinophil # : 0.0 K/uL  Auto Basophil # : 0.0 K/uL  Auto Neutrophil % : 80.8 %  Auto Lymphocyte % : 13.8 %  Auto Monocyte % : 5.0 %  Auto Eosinophil % : 0.1 %  Auto Basophil % : 0.4 %    2017 06:23    136    |  102    |  16     ----------------------------<  79     3.1     |  29     |  0.33     Ca    7.9        2017 06:23      PT/INR - ( 10 Jul 2017 18:20 )   PT: 12.5 sec;   INR: 1.14 ratio         PTT - ( 10 Jul 2017 18:20 )  PTT:30.8 sec  Urinalysis Basic - ( 10 Jul 2017 18:20 )    Color: Green / Appearance: Turbid / S.020 / pH: x  Gluc: x / Ketone: Negative  / Bili: Negative / Urobili: Negative   Blood: x / Protein: 500 mg/dL / Nitrite: Negative   Leuk Esterase: Small / RBC: 11-25 /HPF / WBC 11-25   Sq Epi: x / Non Sq Epi: x / Bacteria: Moderate      CAPILLARY BLOOD GLUCOSE          RECENT CULTURES:        RESPIRATORY CULTURES:      cardiac Enzyme:        Anemia panel:          RADIOLOGY & ADDITIONAL TESTS:    Personally reviewed.     Consultant(s) Notes Reviewed:  [x] YES  [ ] NO    Care Discussed with [x] Consultants  [x] Patient  [ ] Family  [ ]      [ ] Other; RN  DVT ppx Patient is a 81y old  Female who presents with a chief complaint of Fistula of intestine (2017 00:56)      INTERVAL HPI/OVERNIGHT EVENTS: cannot obtain ROS as pt is nonverbal with advanced dementia.    MEDICATIONS  (STANDING):  aspirin  chewable 81 milliGRAM(s) Oral daily  oxyCODONE    IR 5 milliGRAM(s) Oral every 4 hours  carbidopa/levodopa  25/100 1 Tablet(s) Oral two times a day  amLODIPine   Tablet 5 milliGRAM(s) Oral daily  ferrous    sulfate Liquid 300 milliGRAM(s) Enteral Tube daily  pantoprazole   Suspension 40 milliGRAM(s) Oral daily  levothyroxine 50 MICROGram(s) Oral daily  sodium chloride 0.9%. 1000 milliLiter(s) (100 mL/Hr) IV Continuous <Continuous>  vancomycin  IVPB   IV Intermittent   piperacillin/tazobactam IVPB. 3.375 Gram(s) IV Intermittent every 8 hours  vancomycin  IVPB 1000 milliGRAM(s) IV Intermittent every 12 hours    MEDICATIONS  (PRN):  acetaminophen    Suspension 650 milliGRAM(s) Oral every 6 hours PRN For Temp greater than 38 C (100.4 F)      Allergies    No Known Allergies    Intolerances        REVIEW OF SYSTEMS:  Cannot obtain as pt has advanced dementia and is non-verbal    Vital Signs Last 24 Hrs  T(C): 38 (2017 02:30), Max: 38.9 (2017 23:12)  T(F): 100.4 (2017 02:30), Max: 102 (2017 23:12)  HR: 84 (2017 23:12) (84 - 84)  BP: 114/61 (2017 23:12) (98/60 - 114/61)  BP(mean): --  RR: 20 (2017 23:12) (16 - 20)  SpO2: 92% (2017 23:12) (92% - 97%)    PHYSICAL EXAM:  GENERAL: frail, chronically-ill appearing  HEENT:  mmm  CHEST/LUNG:  grossly CTA b/l  HEART:  irregular, S1, S2  ABDOMEN:  BS+, soft, nontender, nondistended; +PEG  : zamorano draining urine with fecal greenish color  EXTREMITIES: no edema or calf tenderness  SKIN:  no rash; unstageable right buttock decubitus ulcer  NERVOUS SYSTEM: nonverbal, does not open eyes to voice, reacts to painful stimuli. does not follow commands    LABS:    CBC Full  -  ( 10 Jul 2017 23:54 )  WBC Count : 6.2 K/uL  Hemoglobin : 10.7 g/dL  Hematocrit : 31.3 %  Platelet Count - Automated : 361 K/uL  Mean Cell Volume : 93.6 fl  Mean Cell Hemoglobin : 31.9 pg  Mean Cell Hemoglobin Concentration : 34.1 gm/dL  Auto Neutrophil # : 5.0 K/uL  Auto Lymphocyte # : 0.9 K/uL  Auto Monocyte # : 0.3 K/uL  Auto Eosinophil # : 0.0 K/uL  Auto Basophil # : 0.0 K/uL  Auto Neutrophil % : 80.8 %  Auto Lymphocyte % : 13.8 %  Auto Monocyte % : 5.0 %  Auto Eosinophil % : 0.1 %  Auto Basophil % : 0.4 %    2017 06:23    136    |  102    |  16     ----------------------------<  79     3.1     |  29     |  0.33     Ca    7.9        2017 06:23      PT/INR - ( 10 Jul 2017 18:20 )   PT: 12.5 sec;   INR: 1.14 ratio         PTT - ( 10 Jul 2017 18:20 )  PTT:30.8 sec  Urinalysis Basic - ( 10 Jul 2017 18:20 )    Color: Green / Appearance: Turbid / S.020 / pH: x  Gluc: x / Ketone: Negative  / Bili: Negative / Urobili: Negative   Blood: x / Protein: 500 mg/dL / Nitrite: Negative   Leuk Esterase: Small / RBC: 11-25 /HPF / WBC 11-25   Sq Epi: x / Non Sq Epi: x / Bacteria: Moderate      CAPILLARY BLOOD GLUCOSE          RECENT CULTURES:        RESPIRATORY CULTURES:      cardiac Enzyme:        Anemia panel:          RADIOLOGY & ADDITIONAL TESTS:    Personally reviewed.     Consultant(s) Notes Reviewed:  [x] YES  [ ] NO    Care Discussed with [x] Consultants  [x] Patient  [ ] Family  [ ]      [ ] Other; RN  DVT ppx Patient is a 81y old  Female who presents with a chief complaint of Fistula of intestine (2017 00:56)      INTERVAL HPI/OVERNIGHT EVENTS: cannot obtain ROS as pt is nonverbal with advanced dementia.    MEDICATIONS  (STANDING):  aspirin  chewable 81 milliGRAM(s) Oral daily  oxyCODONE    IR 5 milliGRAM(s) Oral every 4 hours  carbidopa/levodopa  25/100 1 Tablet(s) Oral two times a day  amLODIPine   Tablet 5 milliGRAM(s) Oral daily  ferrous    sulfate Liquid 300 milliGRAM(s) Enteral Tube daily  pantoprazole   Suspension 40 milliGRAM(s) Oral daily  levothyroxine 50 MICROGram(s) Oral daily  sodium chloride 0.9%. 1000 milliLiter(s) (100 mL/Hr) IV Continuous <Continuous>  vancomycin  IVPB   IV Intermittent   piperacillin/tazobactam IVPB. 3.375 Gram(s) IV Intermittent every 8 hours  vancomycin  IVPB 1000 milliGRAM(s) IV Intermittent every 12 hours    MEDICATIONS  (PRN):  acetaminophen    Suspension 650 milliGRAM(s) Oral every 6 hours PRN For Temp greater than 38 C (100.4 F)      Allergies    No Known Allergies    Intolerances        REVIEW OF SYSTEMS:  Cannot obtain as pt has advanced dementia and is non-verbal    Vital Signs Last 24 Hrs  T(C): 38 (2017 02:30), Max: 38.9 (2017 23:12)  T(F): 100.4 (2017 02:30), Max: 102 (2017 23:12)  HR: 84 (2017 23:12) (84 - 84)  BP: 114/61 (2017 23:12) (98/60 - 114/61)  BP(mean): --  RR: 20 (2017 23:12) (16 - 20)  SpO2: 92% (2017 23:12) (92% - 97%)    PHYSICAL EXAM:  GENERAL: frail, chronically-ill appearing  HEENT:  mmm  CHEST/LUNG:  grossly CTA b/l  HEART:  irregular, S1, S2  ABDOMEN:  BS+, soft, nontender, nondistended; +PEG  : zamorano draining urine with fecal greenish color  EXTREMITIES: no edema or calf tenderness  SKIN:  no rash; unstageable right buttock decubitus ulcer  NERVOUS SYSTEM: nonverbal, does not open eyes to voice, reacts to painful stimuli. does not follow commands    LABS:    CBC Full  -  ( 10 Jul 2017 23:54 )  WBC Count : 6.2 K/uL  Hemoglobin : 10.7 g/dL  Hematocrit : 31.3 %  Platelet Count - Automated : 361 K/uL  Mean Cell Volume : 93.6 fl  Mean Cell Hemoglobin : 31.9 pg  Mean Cell Hemoglobin Concentration : 34.1 gm/dL  Auto Neutrophil # : 5.0 K/uL  Auto Lymphocyte # : 0.9 K/uL  Auto Monocyte # : 0.3 K/uL  Auto Eosinophil # : 0.0 K/uL  Auto Basophil # : 0.0 K/uL  Auto Neutrophil % : 80.8 %  Auto Lymphocyte % : 13.8 %  Auto Monocyte % : 5.0 %  Auto Eosinophil % : 0.1 %  Auto Basophil % : 0.4 %    2017 06:23    136    |  102    |  16     ----------------------------<  79     3.1     |  29     |  0.33     Ca    7.9        2017 06:23      PT/INR - ( 10 Jul 2017 18:20 )   PT: 12.5 sec;   INR: 1.14 ratio         PTT - ( 10 Jul 2017 18:20 )  PTT:30.8 sec  Urinalysis Basic - ( 10 Jul 2017 18:20 )    Color: Green / Appearance: Turbid / S.020 / pH: x  Gluc: x / Ketone: Negative  / Bili: Negative / Urobili: Negative   Blood: x / Protein: 500 mg/dL / Nitrite: Negative   Leuk Esterase: Small / RBC: 11-25 /HPF / WBC 11-25   Sq Epi: x / Non Sq Epi: x / Bacteria: Moderate      CAPILLARY BLOOD GLUCOSE          RECENT CULTURES:        RESPIRATORY CULTURES:      cardiac Enzyme:        Anemia panel:          RADIOLOGY & ADDITIONAL TESTS:    Personally reviewed.     Consultant(s) Notes Reviewed:  [x] YES  [ ] NO    Care Discussed with [x] Consultants  [x] Patient  [ ] Family  [ ]      [ ] Other; RN  DVT ppx: ICD, start HSQ after PEG exchanged

## 2017-07-11 NOTE — PROGRESS NOTE ADULT - PROBLEM SELECTOR PLAN 1
-fistula between GI and urinary tract -- not precisely visualize on CT where the fistulization occurs, but likely is between the colon and bladder. Suspect pt had fecal impaction, that led to stercoral colitis with ulceration of colon and eventually fistula formation to the urinary tract like the bladder.   -had surgical eval, the most surgery is willing to do in the high-risk, frail, advanced dementia pt is a diverting ostomy if the family insists on surgery  -[facility] conversation with family was started given pt's great deal of debility and family would like to hear palliative/hospice options  -tomorrow, they will meet with palliative care and hospice care nurses. -fistula between GI and urinary tract -- not precisely visualized on CT where the fistulization occurs, but likely is between the colon and bladder. Suspect pt had fecal impaction, that led to stercoral colitis with ulceration of colon and eventually fistula formation to the urinary tract like the bladder.   -had surgical eval, the most surgery is willing to do in the high-risk, frail, advanced dementia pt is a diverting ostomy if the family insists on surgery  -[facility] conversation with family was started given pt's great deal of debility and family would like to hear palliative/hospice options  -tomorrow, they will meet with palliative care and hospice care nurses.  -on zosyn which will cover GI jumana that is now in the urinary tract if causing infection  -consider ID eval

## 2017-07-11 NOTE — PROGRESS NOTE ADULT - ASSESSMENT
82yo F w/ PMH of advanced Parkinson's disease, dysphagia s/p PEG, hx of CVA x3 (contraction in legs, non-verbal, bedbound), stage IV decubitus ulcer, sent to ED from [facility] for stool visualized in urine a/w entero-vesicular fistula.

## 2017-07-12 DIAGNOSIS — R53.2 FUNCTIONAL QUADRIPLEGIA: ICD-10-CM

## 2017-07-12 DIAGNOSIS — N32.1 VESICOINTESTINAL FISTULA: ICD-10-CM

## 2017-07-12 DIAGNOSIS — L89.300 PRESSURE ULCER OF UNSPECIFIED BUTTOCK, UNSTAGEABLE: ICD-10-CM

## 2017-07-12 DIAGNOSIS — G20 PARKINSON'S DISEASE: ICD-10-CM

## 2017-07-12 DIAGNOSIS — E03.9 HYPOTHYROIDISM, UNSPECIFIED: ICD-10-CM

## 2017-07-12 DIAGNOSIS — I10 ESSENTIAL (PRIMARY) HYPERTENSION: ICD-10-CM

## 2017-07-12 DIAGNOSIS — J18.9 PNEUMONIA, UNSPECIFIED ORGANISM: ICD-10-CM

## 2017-07-12 LAB
ANION GAP SERPL CALC-SCNC: 5 MMOL/L — SIGNIFICANT CHANGE UP (ref 5–17)
BASOPHILS # BLD AUTO: 0 K/UL — SIGNIFICANT CHANGE UP (ref 0–0.2)
BASOPHILS NFR BLD AUTO: 0.5 % — SIGNIFICANT CHANGE UP (ref 0–2)
BUN SERPL-MCNC: 16 MG/DL — SIGNIFICANT CHANGE UP (ref 7–23)
CALCIUM SERPL-MCNC: 7.9 MG/DL — LOW (ref 8.5–10.1)
CHLORIDE SERPL-SCNC: 102 MMOL/L — SIGNIFICANT CHANGE UP (ref 96–108)
CO2 SERPL-SCNC: 29 MMOL/L — SIGNIFICANT CHANGE UP (ref 22–31)
CREAT SERPL-MCNC: 0.33 MG/DL — LOW (ref 0.5–1.3)
EOSINOPHIL # BLD AUTO: 0 K/UL — SIGNIFICANT CHANGE UP (ref 0–0.5)
EOSINOPHIL NFR BLD AUTO: 0 % — SIGNIFICANT CHANGE UP (ref 0–6)
GLUCOSE SERPL-MCNC: 79 MG/DL — SIGNIFICANT CHANGE UP (ref 70–99)
HCT VFR BLD CALC: 28 % — LOW (ref 34.5–45)
HGB BLD-MCNC: 9.4 G/DL — LOW (ref 11.5–15.5)
LYMPHOCYTES # BLD AUTO: 2.2 K/UL — SIGNIFICANT CHANGE UP (ref 1–3.3)
LYMPHOCYTES # BLD AUTO: 30.8 % — SIGNIFICANT CHANGE UP (ref 13–44)
MAGNESIUM SERPL-MCNC: 1.9 MG/DL — SIGNIFICANT CHANGE UP (ref 1.6–2.6)
MCHC RBC-ENTMCNC: 31.7 PG — SIGNIFICANT CHANGE UP (ref 27–34)
MCHC RBC-ENTMCNC: 33.7 GM/DL — SIGNIFICANT CHANGE UP (ref 32–36)
MCV RBC AUTO: 94.1 FL — SIGNIFICANT CHANGE UP (ref 80–100)
MONOCYTES # BLD AUTO: 0.5 K/UL — SIGNIFICANT CHANGE UP (ref 0–0.9)
MONOCYTES NFR BLD AUTO: 6.5 % — SIGNIFICANT CHANGE UP (ref 1–9)
NEUTROPHILS # BLD AUTO: 4.4 K/UL — SIGNIFICANT CHANGE UP (ref 1.8–7.4)
NEUTROPHILS NFR BLD AUTO: 62.1 % — SIGNIFICANT CHANGE UP (ref 43–77)
PHOSPHATE SERPL-MCNC: 2.8 MG/DL — SIGNIFICANT CHANGE UP (ref 2.5–4.5)
PLATELET # BLD AUTO: 355 K/UL — SIGNIFICANT CHANGE UP (ref 150–400)
POTASSIUM SERPL-MCNC: 3.1 MMOL/L — LOW (ref 3.5–5.3)
POTASSIUM SERPL-SCNC: 3.1 MMOL/L — LOW (ref 3.5–5.3)
RBC # BLD: 2.97 M/UL — LOW (ref 3.8–5.2)
RBC # FLD: 12.4 % — SIGNIFICANT CHANGE UP (ref 10.3–14.5)
SODIUM SERPL-SCNC: 136 MMOL/L — SIGNIFICANT CHANGE UP (ref 135–145)
WBC # BLD: 7.2 K/UL — SIGNIFICANT CHANGE UP (ref 3.8–10.5)
WBC # FLD AUTO: 7.2 K/UL — SIGNIFICANT CHANGE UP (ref 3.8–10.5)

## 2017-07-12 PROCEDURE — 99233 SBSQ HOSP IP/OBS HIGH 50: CPT

## 2017-07-12 RX ORDER — DOCUSATE SODIUM 100 MG
100 CAPSULE ORAL
Qty: 0 | Refills: 0 | Status: DISCONTINUED | OUTPATIENT
Start: 2017-07-12 | End: 2017-07-13

## 2017-07-12 RX ORDER — DOCUSATE SODIUM 100 MG
100 CAPSULE ORAL
Qty: 0 | Refills: 0 | Status: DISCONTINUED | OUTPATIENT
Start: 2017-07-12 | End: 2017-07-12

## 2017-07-12 RX ORDER — HEPARIN SODIUM 5000 [USP'U]/ML
5000 INJECTION INTRAVENOUS; SUBCUTANEOUS EVERY 12 HOURS
Qty: 0 | Refills: 0 | Status: DISCONTINUED | OUTPATIENT
Start: 2017-07-12 | End: 2017-07-13

## 2017-07-12 RX ADMIN — Medication 81 MILLIGRAM(S): at 13:30

## 2017-07-12 RX ADMIN — OXYCODONE HYDROCHLORIDE 5 MILLIGRAM(S): 5 TABLET ORAL at 00:22

## 2017-07-12 RX ADMIN — OXYCODONE HYDROCHLORIDE 5 MILLIGRAM(S): 5 TABLET ORAL at 01:08

## 2017-07-12 RX ADMIN — PIPERACILLIN AND TAZOBACTAM 25 GRAM(S): 4; .5 INJECTION, POWDER, LYOPHILIZED, FOR SOLUTION INTRAVENOUS at 21:43

## 2017-07-12 RX ADMIN — OXYCODONE HYDROCHLORIDE 5 MILLIGRAM(S): 5 TABLET ORAL at 13:29

## 2017-07-12 RX ADMIN — PANTOPRAZOLE SODIUM 40 MILLIGRAM(S): 20 TABLET, DELAYED RELEASE ORAL at 13:31

## 2017-07-12 RX ADMIN — OXYCODONE HYDROCHLORIDE 5 MILLIGRAM(S): 5 TABLET ORAL at 21:43

## 2017-07-12 RX ADMIN — PIPERACILLIN AND TAZOBACTAM 25 GRAM(S): 4; .5 INJECTION, POWDER, LYOPHILIZED, FOR SOLUTION INTRAVENOUS at 07:30

## 2017-07-12 RX ADMIN — CARBIDOPA AND LEVODOPA 1 TABLET(S): 25; 100 TABLET ORAL at 18:38

## 2017-07-12 RX ADMIN — HEPARIN SODIUM 5000 UNIT(S): 5000 INJECTION INTRAVENOUS; SUBCUTANEOUS at 18:38

## 2017-07-12 RX ADMIN — OXYCODONE HYDROCHLORIDE 5 MILLIGRAM(S): 5 TABLET ORAL at 18:38

## 2017-07-12 RX ADMIN — OXYCODONE HYDROCHLORIDE 5 MILLIGRAM(S): 5 TABLET ORAL at 22:40

## 2017-07-12 RX ADMIN — Medication 300 MILLIGRAM(S): at 13:29

## 2017-07-12 RX ADMIN — CARBIDOPA AND LEVODOPA 1 TABLET(S): 25; 100 TABLET ORAL at 13:32

## 2017-07-12 RX ADMIN — PIPERACILLIN AND TAZOBACTAM 25 GRAM(S): 4; .5 INJECTION, POWDER, LYOPHILIZED, FOR SOLUTION INTRAVENOUS at 13:29

## 2017-07-12 RX ADMIN — SODIUM CHLORIDE 100 MILLILITER(S): 9 INJECTION INTRAMUSCULAR; INTRAVENOUS; SUBCUTANEOUS at 07:30

## 2017-07-12 RX ADMIN — Medication 250 MILLIGRAM(S): at 00:23

## 2017-07-12 RX ADMIN — Medication 650 MILLIGRAM(S): at 00:22

## 2017-07-12 RX ADMIN — CARBIDOPA AND LEVODOPA 1 TABLET(S): 25; 100 TABLET ORAL at 00:22

## 2017-07-12 RX ADMIN — Medication 50 MICROGRAM(S): at 07:27

## 2017-07-12 NOTE — PROGRESS NOTE ADULT - ASSESSMENT
80yo F w/ PMH of advanced Parkinson's disease, dysphagia s/p PEG, hx of CVA x3 (contraction in legs, non-verbal, bedbound), stage IV decubitus ulcer, sent to ED from [facility] for stool visualized in urine a/w entero-vesicular fistula.

## 2017-07-12 NOTE — PROGRESS NOTE ADULT - SUBJECTIVE AND OBJECTIVE BOX
Patient is a 81y old  Female who presents with a chief complaint of Fistula of intestine (11 Jul 2017 00:56)      INTERVAL HPI/OVERNIGHT EVENTS: non-verbal, advanced dementia cannot provide ROS. No acute events overnight.    MEDICATIONS  (STANDING):  aspirin  chewable 81 milliGRAM(s) Oral daily  oxyCODONE    IR 5 milliGRAM(s) Oral every 4 hours  carbidopa/levodopa  25/100 1 Tablet(s) Oral two times a day  amLODIPine   Tablet 5 milliGRAM(s) Oral daily  ferrous    sulfate Liquid 300 milliGRAM(s) Enteral Tube daily  pantoprazole   Suspension 40 milliGRAM(s) Oral daily  levothyroxine 50 MICROGram(s) Oral daily  piperacillin/tazobactam IVPB. 3.375 Gram(s) IV Intermittent every 8 hours  docusate sodium 100 milliGRAM(s) Oral two times a day  heparin  Injectable 5000 Unit(s) SubCutaneous every 12 hours    MEDICATIONS  (PRN):  acetaminophen    Suspension 650 milliGRAM(s) Oral every 6 hours PRN For Temp greater than 38 C (100.4 F)      Allergies    No Known Allergies    Intolerances        REVIEW OF SYSTEMS:  pt is non-verbal, advanced dementia cannot provide ROS    Vital Signs Last 24 Hrs  T(C): 36.6 (12 Jul 2017 23:58), Max: 38 (12 Jul 2017 02:30)  T(F): 97.9 (12 Jul 2017 23:58), Max: 100.4 (12 Jul 2017 02:30)  HR: 60 (12 Jul 2017 23:58) (60 - 70)  BP: 99/65 (12 Jul 2017 23:58) (96/55 - 110/62)  BP(mean): --  RR: 16 (12 Jul 2017 23:58) (16 - 18)  SpO2: 99% (12 Jul 2017 23:58) (99% - 100%)    PHYSICAL EXAM:  GENERAL: frail, chronically-ill appearing  HEENT:  poor dentition  CHEST/LUNG:  grossly CTA b/l  HEART:  irregular, S1, S2  ABDOMEN:  BS+, soft, nondistended; no apparent tenderness, +PEG  : zamorano draining urine with fecal greenish color  EXTREMITIES: no edema or calf tenderness  SKIN:  no rash; stage 4 decubitus ulcer on the right buttock ~1.5 x 1.5 x 0.5 cm.   NERVOUS SYSTEM: nonverbal, does not open eyes to voice, reacts to painful stimuli. does not follow commands    LABS:                        9.4    7.2   )-----------( 355      ( 12 Jul 2017 06:23 )             28.0     CBC Full  -  ( 12 Jul 2017 06:23 )  WBC Count : 7.2 K/uL  Hemoglobin : 9.4 g/dL  Hematocrit : 28.0 %  Platelet Count - Automated : 355 K/uL  Mean Cell Volume : 94.1 fl  Mean Cell Hemoglobin : 31.7 pg  Mean Cell Hemoglobin Concentration : 33.7 gm/dL  Auto Neutrophil # : 4.4 K/uL  Auto Lymphocyte # : 2.2 K/uL  Auto Monocyte # : 0.5 K/uL  Auto Eosinophil # : 0.0 K/uL  Auto Basophil # : 0.0 K/uL  Auto Neutrophil % : 62.1 %  Auto Lymphocyte % : 30.8 %  Auto Monocyte % : 6.5 %  Auto Eosinophil % : 0.0 %  Auto Basophil % : 0.5 %    12 Jul 2017 06:23    136    |  102    |  16     ----------------------------<  79     3.1     |  29     |  0.33     Ca    7.9        12 Jul 2017 06:23  Phos  2.8       12 Jul 2017 06:23  Mg     1.9       12 Jul 2017 06:23          CAPILLARY BLOOD GLUCOSE          RECENT CULTURES:        RESPIRATORY CULTURES:      cardiac Enzyme:        Anemia panel:          RADIOLOGY & ADDITIONAL TESTS:    Personally reviewed.     Consultant(s) Notes Reviewed:  [x] YES  [ ] NO    Care Discussed with [x] Consultants  [ ] Patient  [x] Family  [ ]      [ ] Other; RN  DVT ppx: JAYLEEN Patient is a 81y old  Female who presents with a chief complaint of Fistula of intestine (11 Jul 2017 00:56)    INTERVAL HPI/OVERNIGHT EVENTS: non-verbal, advanced dementia cannot provide ROS. No acute events overnight.    MEDICATIONS  (STANDING):  aspirin  chewable 81 milliGRAM(s) Oral daily  oxyCODONE    IR 5 milliGRAM(s) Oral every 4 hours  carbidopa/levodopa  25/100 1 Tablet(s) Oral two times a day  amLODIPine   Tablet 5 milliGRAM(s) Oral daily  ferrous    sulfate Liquid 300 milliGRAM(s) Enteral Tube daily  pantoprazole   Suspension 40 milliGRAM(s) Oral daily  levothyroxine 50 MICROGram(s) Oral daily  piperacillin/tazobactam IVPB. 3.375 Gram(s) IV Intermittent every 8 hours  docusate sodium 100 milliGRAM(s) Oral two times a day  heparin  Injectable 5000 Unit(s) SubCutaneous every 12 hours    MEDICATIONS  (PRN):  acetaminophen    Suspension 650 milliGRAM(s) Oral every 6 hours PRN For Temp greater than 38 C (100.4 F)      Allergies    No Known Allergies    Intolerances        REVIEW OF SYSTEMS:  pt is non-verbal, advanced dementia cannot provide ROS    Vital Signs Last 24 Hrs  T(C): 36.6 (12 Jul 2017 23:58), Max: 38 (12 Jul 2017 02:30)  T(F): 97.9 (12 Jul 2017 23:58), Max: 100.4 (12 Jul 2017 02:30)  HR: 60 (12 Jul 2017 23:58) (60 - 70)  BP: 99/65 (12 Jul 2017 23:58) (96/55 - 110/62)  BP(mean): --  RR: 16 (12 Jul 2017 23:58) (16 - 18)  SpO2: 99% (12 Jul 2017 23:58) (99% - 100%)    PHYSICAL EXAM:  GENERAL: frail, chronically-ill appearing  HEENT:  poor dentition  CHEST/LUNG:  grossly CTA b/l  HEART:  irregular, S1, S2  ABDOMEN:  BS+, soft, nondistended; no apparent tenderness, +PEG  : zamorano draining urine with fecal greenish color  EXTREMITIES: no edema or calf tenderness  SKIN:  no rash; stage 4 decubitus ulcer on the right buttock ~1.5 x 1.5 x 0.5 cm.   NERVOUS SYSTEM: nonverbal, does not open eyes to voice, reacts to painful stimuli. does not follow commands    LABS:                        9.4    7.2   )-----------( 355      ( 12 Jul 2017 06:23 )             28.0     CBC Full  -  ( 12 Jul 2017 06:23 )  WBC Count : 7.2 K/uL  Hemoglobin : 9.4 g/dL  Hematocrit : 28.0 %  Platelet Count - Automated : 355 K/uL  Mean Cell Volume : 94.1 fl  Mean Cell Hemoglobin : 31.7 pg  Mean Cell Hemoglobin Concentration : 33.7 gm/dL  Auto Neutrophil # : 4.4 K/uL  Auto Lymphocyte # : 2.2 K/uL  Auto Monocyte # : 0.5 K/uL  Auto Eosinophil # : 0.0 K/uL  Auto Basophil # : 0.0 K/uL  Auto Neutrophil % : 62.1 %  Auto Lymphocyte % : 30.8 %  Auto Monocyte % : 6.5 %  Auto Eosinophil % : 0.0 %  Auto Basophil % : 0.5 %    12 Jul 2017 06:23    136    |  102    |  16     ----------------------------<  79     3.1     |  29     |  0.33     Ca    7.9        12 Jul 2017 06:23  Phos  2.8       12 Jul 2017 06:23  Mg     1.9       12 Jul 2017 06:23          CAPILLARY BLOOD GLUCOSE          RECENT CULTURES:        RESPIRATORY CULTURES:      cardiac Enzyme:        Anemia panel:          RADIOLOGY & ADDITIONAL TESTS:    Personally reviewed.     Consultant(s) Notes Reviewed:  [x] YES  [ ] NO    Care Discussed with [x] Consultants  [ ] Patient  [x] Family  [ ]      [ ] Other; RN  DVT ppx: JAYLEEN

## 2017-07-12 NOTE — PROGRESS NOTE ADULT - SUBJECTIVE AND OBJECTIVE BOX
INTERVAL HPI/OVERNIGHT EVENTS: no acute events  HPI:  82 YO f s/p peg tube exchange at bedside.    MEDICATIONS  (STANDING):  aspirin  chewable 81 milliGRAM(s) Oral daily  oxyCODONE    IR 5 milliGRAM(s) Oral every 4 hours  carbidopa/levodopa  25/100 1 Tablet(s) Oral two times a day  amLODIPine   Tablet 5 milliGRAM(s) Oral daily  ferrous    sulfate Liquid 300 milliGRAM(s) Enteral Tube daily  pantoprazole   Suspension 40 milliGRAM(s) Oral daily  levothyroxine 50 MICROGram(s) Oral daily  piperacillin/tazobactam IVPB. 3.375 Gram(s) IV Intermittent every 8 hours  docusate sodium 100 milliGRAM(s) Oral two times a day  heparin  Injectable 5000 Unit(s) SubCutaneous every 12 hours    MEDICATIONS  (PRN):  acetaminophen    Suspension 650 milliGRAM(s) Oral every 6 hours PRN For Temp greater than 38 C (100.4 F)      Allergies    No Known Allergies    Intolerances      PHYSICAL EXAM:   Vital Signs:  Vital Signs Last 24 Hrs  T(C): 36.8 (2017 16:59), Max: 38.9 (2017 23:12)  T(F): 98.2 (2017 16:59), Max: 102 (2017 23:12)  HR: 70 (2017 16:59) (65 - 84)  BP: 110/62 (2017 16:59) (96/55 - 114/61)  BP(mean): --  RR: 18 (2017 16:59) (17 - 20)  SpO2: 100% (2017 16:59) (92% - 100%)  Daily     Daily Weight in k.1 (2017 05:19)I&O's Summary    2017 07:  -  2017 07:00  --------------------------------------------------------  IN: 1550 mL / OUT: 700 mL / NET: 850 mL    2017 07:01  -  2017 17:06  --------------------------------------------------------  IN: 50 mL / OUT: 300 mL / NET: -250 mL        GENERAL:  unresponsive  HEENT:  NC/AT,  conjunctivae clear and pink, no thyromegaly, nodules, adenopathy, no JVD, sclera -anicteric  CHEST:  Full & symmetric excursion, no increased effort, breath sounds clear  HEART:  Regular rhythm, S1, S2, no murmur/rub/S3/S4, no abdominal bruit, no edema  ABDOMEN:  Soft, non-tender, non-distended, normoactive bowel sounds,  no masses ,no hepato-splenomegaly, no signs of chronic liver disease, peg tube in place, freely movable  EXTEREMITIES:  no cyanosis,clubbing or edema  SKIN:  No rash/erythema/ecchymoses/petechiae/wounds/abscess/warm/dry  NEURO:  Alert, oriented, no asterixis, no tremor, no encephalopathy      LABS:                        9.4    7.2   )-----------( 355      ( 2017 06:23 )             28.0     07-12    136  |  102  |  16  ----------------------------<  79  3.1<L>   |  29  |  0.33<L>    Ca    7.9<L>      2017 06:23  Phos  2.8     07-12  Mg     1.9     07-12    TPro  7.3  /  Alb  2.5<L>  /  TBili  0.3  /  DBili  x   /  AST  97<H>  /  ALT  85<H>  /  AlkPhos  445<H>  07-10    PT/INR - ( 10 Jul 2017 18:20 )   PT: 12.5 sec;   INR: 1.14 ratio         PTT - ( 10 Jul 2017 18:20 )  PTT:30.8 sec  Urinalysis Basic - ( 10 Jul 2017 18:20 )    Color: Green / Appearance: Turbid / S.020 / pH: x  Gluc: x / Ketone: Negative  / Bili: Negative / Urobili: Negative   Blood: x / Protein: 500 mg/dL / Nitrite: Negative   Leuk Esterase: Small / RBC: 11-25 /HPF / WBC 11-25   Sq Epi: x / Non Sq Epi: x / Bacteria: Moderate      amylase   lipaseLipase, Serum: 82 U/L (-10 @ 18:20)    RADIOLOGY & ADDITIONAL TESTS:

## 2017-07-12 NOTE — PROGRESS NOTE ADULT - PROBLEM SELECTOR PLAN 3
-PEG exchanged last night   -restarted tube feeds today
-family reports facility had had some difficulty with the PEG  -GI to see and exchange today

## 2017-07-12 NOTE — PROGRESS NOTE ADULT - SUBJECTIVE AND OBJECTIVE BOX
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

## 2017-07-12 NOTE — PROGRESS NOTE ADULT - PROBLEM SELECTOR PLAN 4
-pt completely bed bound and needs assistance with all ADLs due to advanced Parkinson and three strokes  -c/w nursing care  -GOC, palliative / hospice eval
-pt completely bed bound and needs assistance with all ADLs due to advanced Parkinson and three strokes  -c/w nursing care  -GOC, palliative / hospice eval

## 2017-07-12 NOTE — PROGRESS NOTE ADULT - PROBLEM SELECTOR PLAN 9
due to functional quadriplegia, bedbound  -nursing care, frequent position changes  -wound care consult appreciated
due to functional quadriplegia, bedbound  -nursing care, frequent position changes  -wound care consult

## 2017-07-12 NOTE — PROGRESS NOTE ADULT - PROBLEM SELECTOR PLAN 1
-fistula between GI and urinary tract -- not precisely visualized on CT where the fistulization occurs, but likely is between the colon and bladder. Suspect pt had fecal impaction, that led to stercoral colitis with ulceration of colon and eventually fistula formation to the urinary tract like the bladder.   -had surgical eval, the most surgery is willing to do in the high-risk, frail, advanced dementia pt is a diverting ostomy if the family insists on surgery  -[facility] conversation with family was continued along with palliative care nurse and given pt's great deal of debility and family interested in hearing about hospice options -- hospice to speak with them tomorrow  -family contemplating diverting ostomy  -on zosyn which will cover GI jumana that is now in the urinary tract if causing infection  -consider ID eval

## 2017-07-12 NOTE — PROGRESS NOTE ADULT - PROBLEM SELECTOR PLAN 5
-impaction was cleared in the ED  -start stool softener  -likely worsened by advanced Parkinson, immobility  -may end up having diverting ostomy due to fistula formation if family wishes to pursue
-impaction was cleared in the ED  -start stool softener  -likely worsened by advanced Parkinson, immobility  -may end up having diverting ostomy due to fistula formation if family wishes to pursue

## 2017-07-12 NOTE — PROVIDER CONTACT NOTE (CHANGE IN STATUS NOTIFICATION) - ASSESSMENT
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

## 2017-07-12 NOTE — PROGRESS NOTE ADULT - PROBLEM SELECTOR PLAN 7
c/w sinemet   advanced; functional quadriplegia -- GOC/ palliative
c/w sinemet   advanced; functional quadriplegia -- GOC/ palliative

## 2017-07-13 VITALS
DIASTOLIC BLOOD PRESSURE: 62 MMHG | TEMPERATURE: 98 F | HEART RATE: 68 BPM | RESPIRATION RATE: 17 BRPM | SYSTOLIC BLOOD PRESSURE: 104 MMHG | OXYGEN SATURATION: 97 %

## 2017-07-13 DIAGNOSIS — L89.304 PRESSURE ULCER OF UNSPECIFIED BUTTOCK, STAGE 4: ICD-10-CM

## 2017-07-13 LAB
ANION GAP SERPL CALC-SCNC: 7 MMOL/L — SIGNIFICANT CHANGE UP (ref 5–17)
BUN SERPL-MCNC: 13 MG/DL — SIGNIFICANT CHANGE UP (ref 7–23)
CALCIUM SERPL-MCNC: 7.9 MG/DL — LOW (ref 8.5–10.1)
CHLORIDE SERPL-SCNC: 103 MMOL/L — SIGNIFICANT CHANGE UP (ref 96–108)
CO2 SERPL-SCNC: 29 MMOL/L — SIGNIFICANT CHANGE UP (ref 22–31)
CREAT SERPL-MCNC: 0.3 MG/DL — LOW (ref 0.5–1.3)
GLUCOSE SERPL-MCNC: 148 MG/DL — HIGH (ref 70–99)
HCT VFR BLD CALC: 28.5 % — LOW (ref 34.5–45)
HGB BLD-MCNC: 9.7 G/DL — LOW (ref 11.5–15.5)
MCHC RBC-ENTMCNC: 32 PG — SIGNIFICANT CHANGE UP (ref 27–34)
MCHC RBC-ENTMCNC: 34 GM/DL — SIGNIFICANT CHANGE UP (ref 32–36)
MCV RBC AUTO: 94.3 FL — SIGNIFICANT CHANGE UP (ref 80–100)
PLATELET # BLD AUTO: 332 K/UL — SIGNIFICANT CHANGE UP (ref 150–400)
POTASSIUM SERPL-MCNC: 2.8 MMOL/L — CRITICAL LOW (ref 3.5–5.3)
POTASSIUM SERPL-SCNC: 2.8 MMOL/L — CRITICAL LOW (ref 3.5–5.3)
RBC # BLD: 3.02 M/UL — LOW (ref 3.8–5.2)
RBC # FLD: 12.2 % — SIGNIFICANT CHANGE UP (ref 10.3–14.5)
SODIUM SERPL-SCNC: 139 MMOL/L — SIGNIFICANT CHANGE UP (ref 135–145)
WBC # BLD: 5.6 K/UL — SIGNIFICANT CHANGE UP (ref 3.8–10.5)
WBC # FLD AUTO: 5.6 K/UL — SIGNIFICANT CHANGE UP (ref 3.8–10.5)

## 2017-07-13 PROCEDURE — 85610 PROTHROMBIN TIME: CPT

## 2017-07-13 PROCEDURE — 74176 CT ABD & PELVIS W/O CONTRAST: CPT

## 2017-07-13 PROCEDURE — 84100 ASSAY OF PHOSPHORUS: CPT

## 2017-07-13 PROCEDURE — 81001 URINALYSIS AUTO W/SCOPE: CPT

## 2017-07-13 PROCEDURE — 99285 EMERGENCY DEPT VISIT HI MDM: CPT | Mod: 25

## 2017-07-13 PROCEDURE — 49465 FLUORO EXAM OF G/COLON TUBE: CPT

## 2017-07-13 PROCEDURE — 83690 ASSAY OF LIPASE: CPT

## 2017-07-13 PROCEDURE — 85730 THROMBOPLASTIN TIME PARTIAL: CPT

## 2017-07-13 PROCEDURE — 83605 ASSAY OF LACTIC ACID: CPT

## 2017-07-13 PROCEDURE — 80048 BASIC METABOLIC PNL TOTAL CA: CPT

## 2017-07-13 PROCEDURE — 84145 PROCALCITONIN (PCT): CPT

## 2017-07-13 PROCEDURE — 83735 ASSAY OF MAGNESIUM: CPT

## 2017-07-13 PROCEDURE — 85027 COMPLETE CBC AUTOMATED: CPT

## 2017-07-13 PROCEDURE — 93005 ELECTROCARDIOGRAM TRACING: CPT

## 2017-07-13 PROCEDURE — 87086 URINE CULTURE/COLONY COUNT: CPT

## 2017-07-13 PROCEDURE — 96365 THER/PROPH/DIAG IV INF INIT: CPT

## 2017-07-13 PROCEDURE — 87040 BLOOD CULTURE FOR BACTERIA: CPT

## 2017-07-13 PROCEDURE — 99239 HOSP IP/OBS DSCHRG MGMT >30: CPT

## 2017-07-13 PROCEDURE — 80053 COMPREHEN METABOLIC PANEL: CPT

## 2017-07-13 PROCEDURE — 71045 X-RAY EXAM CHEST 1 VIEW: CPT

## 2017-07-13 RX ORDER — DOCUSATE SODIUM 100 MG
100 CAPSULE ORAL
Qty: 0 | Refills: 0 | Status: DISCONTINUED | OUTPATIENT
Start: 2017-07-13 | End: 2017-07-13

## 2017-07-13 RX ORDER — AMLODIPINE BESYLATE 2.5 MG/1
1 TABLET ORAL
Qty: 0 | Refills: 0 | COMMUNITY

## 2017-07-13 RX ORDER — POTASSIUM CHLORIDE 20 MEQ
40 PACKET (EA) ORAL EVERY 4 HOURS
Qty: 0 | Refills: 0 | Status: DISCONTINUED | OUTPATIENT
Start: 2017-07-13 | End: 2017-07-13

## 2017-07-13 RX ORDER — OXYCODONE HYDROCHLORIDE 5 MG/1
1 TABLET ORAL
Qty: 0 | Refills: 0 | COMMUNITY
Start: 2017-07-13

## 2017-07-13 RX ORDER — LEVOTHYROXINE SODIUM 125 MCG
1 TABLET ORAL
Qty: 0 | Refills: 0 | COMMUNITY

## 2017-07-13 RX ORDER — POTASSIUM CHLORIDE 20 MEQ
1 PACKET (EA) ORAL
Qty: 0 | Refills: 0 | COMMUNITY
Start: 2017-07-13

## 2017-07-13 RX ORDER — ACETAMINOPHEN 500 MG
20 TABLET ORAL
Qty: 0 | Refills: 0 | COMMUNITY
Start: 2017-07-13

## 2017-07-13 RX ORDER — DOCUSATE SODIUM 100 MG
10 CAPSULE ORAL
Qty: 200 | Refills: 0 | OUTPATIENT
Start: 2017-07-13

## 2017-07-13 RX ORDER — ASPIRIN/CALCIUM CARB/MAGNESIUM 324 MG
1 TABLET ORAL
Qty: 0 | Refills: 0 | COMMUNITY

## 2017-07-13 RX ORDER — OXYCODONE HYDROCHLORIDE 5 MG/1
5 TABLET ORAL ONCE
Qty: 0 | Refills: 0 | Status: DISCONTINUED | OUTPATIENT
Start: 2017-07-13 | End: 2017-07-13

## 2017-07-13 RX ADMIN — PIPERACILLIN AND TAZOBACTAM 25 GRAM(S): 4; .5 INJECTION, POWDER, LYOPHILIZED, FOR SOLUTION INTRAVENOUS at 13:22

## 2017-07-13 RX ADMIN — OXYCODONE HYDROCHLORIDE 5 MILLIGRAM(S): 5 TABLET ORAL at 13:23

## 2017-07-13 RX ADMIN — OXYCODONE HYDROCHLORIDE 5 MILLIGRAM(S): 5 TABLET ORAL at 03:00

## 2017-07-13 RX ADMIN — OXYCODONE HYDROCHLORIDE 5 MILLIGRAM(S): 5 TABLET ORAL at 02:01

## 2017-07-13 RX ADMIN — OXYCODONE HYDROCHLORIDE 5 MILLIGRAM(S): 5 TABLET ORAL at 14:00

## 2017-07-13 RX ADMIN — OXYCODONE HYDROCHLORIDE 5 MILLIGRAM(S): 5 TABLET ORAL at 11:30

## 2017-07-13 RX ADMIN — PIPERACILLIN AND TAZOBACTAM 25 GRAM(S): 4; .5 INJECTION, POWDER, LYOPHILIZED, FOR SOLUTION INTRAVENOUS at 05:51

## 2017-07-13 RX ADMIN — Medication 40 MILLIEQUIVALENT(S): at 11:05

## 2017-07-13 RX ADMIN — OXYCODONE HYDROCHLORIDE 5 MILLIGRAM(S): 5 TABLET ORAL at 07:00

## 2017-07-13 RX ADMIN — Medication 40 MILLIEQUIVALENT(S): at 13:23

## 2017-07-13 RX ADMIN — Medication 81 MILLIGRAM(S): at 13:33

## 2017-07-13 RX ADMIN — PANTOPRAZOLE SODIUM 40 MILLIGRAM(S): 20 TABLET, DELAYED RELEASE ORAL at 11:09

## 2017-07-13 RX ADMIN — Medication 50 MICROGRAM(S): at 05:50

## 2017-07-13 RX ADMIN — Medication 300 MILLIGRAM(S): at 11:05

## 2017-07-13 RX ADMIN — OXYCODONE HYDROCHLORIDE 5 MILLIGRAM(S): 5 TABLET ORAL at 11:05

## 2017-07-13 RX ADMIN — CARBIDOPA AND LEVODOPA 1 TABLET(S): 25; 100 TABLET ORAL at 05:50

## 2017-07-13 RX ADMIN — OXYCODONE HYDROCHLORIDE 5 MILLIGRAM(S): 5 TABLET ORAL at 15:43

## 2017-07-13 RX ADMIN — HEPARIN SODIUM 5000 UNIT(S): 5000 INJECTION INTRAVENOUS; SUBCUTANEOUS at 05:51

## 2017-07-13 RX ADMIN — OXYCODONE HYDROCHLORIDE 5 MILLIGRAM(S): 5 TABLET ORAL at 05:50

## 2017-07-13 NOTE — PROGRESS NOTE ADULT - SUBJECTIVE AND OBJECTIVE BOX
INTERVAL HPI/OVERNIGHT EVENTS: No new overnight event.  No N/V/D.      HPI:  82 YO F PMH Parkinsons, dysphagia, PEG, stage IV decubitus, sent to ED from [facility] for stool visualized in urine. Pt aphasic/non verbal, records from ED and SNF reviewed and family called ( awaiting callback).  In ED, CT ABD pelvis perfomed and interpreted with findings of bilar infiltrates, fecal impaction, colitis and fistula within bladder. (10 Jul 2017 22:25)    MEDICATIONS  (STANDING):  aspirin  chewable 81 milliGRAM(s) Oral daily  oxyCODONE    IR 5 milliGRAM(s) Oral every 4 hours  carbidopa/levodopa  25/100 1 Tablet(s) Oral two times a day  amLODIPine   Tablet 5 milliGRAM(s) Oral daily  ferrous    sulfate Liquid 300 milliGRAM(s) Enteral Tube daily  pantoprazole   Suspension 40 milliGRAM(s) Oral daily  levothyroxine 50 MICROGram(s) Oral daily  piperacillin/tazobactam IVPB. 3.375 Gram(s) IV Intermittent every 8 hours  heparin  Injectable 5000 Unit(s) SubCutaneous every 12 hours  docusate sodium 100 milliGRAM(s) Oral two times a day  potassium chloride   Powder 40 milliEquivalent(s) Oral every 4 hours    MEDICATIONS  (PRN):  acetaminophen    Suspension 650 milliGRAM(s) Oral every 6 hours PRN For Temp greater than 38 C (100.4 F)      Allergies    No Known Allergies    Intolerances          General:  No wt loss, fevers, chills, night sweats, fatigue,   Eyes:  Good vision, no reported pain  ENT:  No sore throat, pain, runny nose, dysphagia  CV:  No pain, palpitations, hypo/hypertension  Resp:  No dyspnea, cough, tachypnea, wheezing  GI:  No pain, No nausea, No vomiting, No diarrhea, No constipation, No weight loss, No fever, No pruritis, No rectal bleeding, No tarry stools, No dysphagia,  :  No pain, bleeding, incontinence, nocturia  Muscle:  No pain, weakness  Neuro:  No weakness, tingling, memory problems  Psych:  No fatigue, insomnia, mood problems, depression  Endocrine:  No polyuria, polydipsia, cold/heat intolerance  Heme:  No petechiae, ecchymosis, easy bruisability  Skin:  No rash, tattoos, scars, edema      PHYSICAL EXAM:   Vital Signs:  Vital Signs Last 24 Hrs  T(C): 36.9 (13 Jul 2017 16:04), Max: 36.9 (12 Jul 2017 21:49)  T(F): 98.4 (13 Jul 2017 16:04), Max: 98.5 (12 Jul 2017 21:49)  HR: 68 (13 Jul 2017 16:04) (60 - 70)  BP: 104/62 (13 Jul 2017 16:04) (99/65 - 110/62)  BP(mean): --  RR: 17 (13 Jul 2017 16:04) (16 - 18)  SpO2: 97% (13 Jul 2017 16:04) (97% - 100%)  Daily     Daily I&O's Summary    12 Jul 2017 07:01  -  13 Jul 2017 07:00  --------------------------------------------------------  IN: 50 mL / OUT: 700 mL / NET: -650 mL    13 Jul 2017 07:01  -  13 Jul 2017 16:25  --------------------------------------------------------  IN: 0 mL / OUT: 200 mL / NET: -200 mL        GENERAL:  unresponsive  HEENT:  NC/AT,  conjunctivae clear and pink, no thyromegaly, nodules, adenopathy, no JVD, sclera -anicteric  CHEST:  Full & symmetric excursion, no increased effort, breath sounds clear  HEART:  Regular rhythm, S1, S2, no murmur/rub/S3/S4, no abdominal bruit, no edema  ABDOMEN:  Soft, non-tender, non-distended, normoactive bowel sounds,  no masses ,no hepato-splenomegaly, no signs of chronic liver disease, peg tube in place, freely movable  EXTEREMITIES:  no cyanosis,clubbing or edema  SKIN:  No rash/erythema/ecchymoses/petechiae/wounds/abscess/warm/dry  NEURO:  Alert, oriented, no asterixis, no tremor, no encephalopathy      LABS:                        9.7    5.6   )-----------( 332      ( 13 Jul 2017 07:17 )             28.5     07-13    139  |  103  |  13  ----------------------------<  148<H>  2.8<LL>   |  29  |  0.30<L>    Ca    7.9<L>      13 Jul 2017 07:17  Phos  2.8     07-12  Mg     1.9     07-12          amylase   lipaseLipase, Serum: 82 U/L (07-10 @ 18:20)    RADIOLOGY & ADDITIONAL TESTS:

## 2017-07-13 NOTE — DISCHARGE NOTE ADULT - HOSPITAL COURSE
. 80yo F w/ PMH of advanced Parkinson's disease, dysphagia s/p PEG, hx of CVA x3 (contraction in legs, non-verbal, bedbound), stage IV right buttock decubitus ulcer, sent to ED from [facility] for stool visualized in urine a/w entero-vesicular fistula. Pt had CT Abd/P without contrast that showed: Fecal impaction with thick-walled distended rectosigmoid colon, correlate for stercoral colitis. Large amount of intraluminal air within the thickened urinary bladder, correlate for an emphysematous cystitis or enteric fistula formation. Bibasilar airspace consolidation, which may reflect pneumonia.  Pt was disimpacted in the ER. Suspicion is that pt had fecal impaction that eventually led to stercoral colitis/ulceration that fistulized to the abutting bladder. Lactate normal. No leukocytosis. Pt treated with zosyn to cover both GI jumana and potential aspiration pneumonia. Surgery (Dr. Wang) evaluated the pt and offered performing a diverting ostomy if family wishes to pursue surgery (surgery deemed more involved procedures with anastomosis etc. to be inappropriate with risks > benefits in this pt). Pt's PEG was old with some possible dysfunction, and GI (Dr. Barton) replaced the PEG. Pt was hypokalemic and received repletions. Family do not want to pursue frequent blood draws to monitor electrolytes and will simply start pt on a small dose of daily potassium (20meq daily). Wound care evaluated the stage 4 decubitus ulcer and as no surgeries will be done on the pt, recommended conservative management with every other day cleaning/iodosorb packing. BCx were negative. UCx showed >3 organisms (likely GI jumana) given fistula. Given pt's significant debility (non-verbal, contracted/bedbound, demented, dysphagia), goals of care discussion with family was done and they decided to pursue conservative, comfort-oriented care. No surgery to be done. Made pt DNR/DNI. Agreed to hospice and pt will be discharged back to nursing home with hospice. Pt's family opted to finish an antibiotic course for the potential aspiration PNA pt may have, and would accept antibiotics for the pt in the future if would improve comfort. Focus on controlling symptoms -- pain, SOB. Added a breakthrough pain or dyspnea oxycodone to the oxy pt receives standing.     On day of discharge:  ROS: cannot obtain as pt is non-verbal  VS: 104/62; HR: 68; T: 98.4; RR: 17; 97% on 2L NC  Phys exam: NAD  GENERAL: frail, chronically-ill appearing  HEENT:  poor dentition  CHEST/LUNG:  grossly CTA b/l with some diminished breath sounds at the bases  HEART:  irregular, S1, S2  ABDOMEN:  BS+, soft, nondistended; no apparent tenderness, +PEG  : zamorano draining urine with fecal greenish color  EXTREMITIES: no edema or calf tenderness  SKIN:  no rash; stage 4 decubitus ulcer on the right buttock ~1.5 x 1.5 x 0.5 cm.   NERVOUS SYSTEM: nonverbal, does not open eyes to voice, reacts to shaking or painful stimuli. Does not follow commands    Time spent: 45min    Called PMD's office and left message regarding discharge.

## 2017-07-13 NOTE — DISCHARGE NOTE ADULT - PATIENT PORTAL LINK FT
“You can access the Follow[facility] Patient Portal, offered by [facility], by registering with the following website: [email or web address]”

## 2017-07-13 NOTE — DISCHARGE NOTE ADULT - INSTRUCTIONS
Continue the Vital 1.5 tube feeds with Dl supplementation that pt was on in the facility prior to hospitalization.

## 2017-07-13 NOTE — DISCHARGE NOTE ADULT - PLAN OF CARE
resolution Complete 4 more days of Augmentin as prescribed for possible aspiration pneumonia.  Keep head of bed above 30 degrees to lessen chances of aspiration. Family decided against surgical intervention given risks of surgery. Pt will have fecal matter that could drain from the urinary tract.  Oxycodone 5mg every 4 hours standing , and additional 5mg q4h PRN oxycodone if pt having signs of pain or respiratory distress. Follow hospice care recommendations regarding keeping the pt comfortable. Continue the Vital 1.5 tube feeds with Dl supplementation that pt was on in the facility prior to hospitalization.  Head of bed elevation above 30 degrees, to lessen chance of aspiration. Nursing care with all activities of daily living. Continue sinemet PEG was replaced. Family wishes to continue tube feeds for now. Continue Dl supplements with tube feeds.  Keep pressure off the ulcer if possible.  Every other day wound care: cleanse the wound with saline, pack gently with iodosorb packing strip and cover with a 4x4 gauze.

## 2017-07-13 NOTE — DISCHARGE NOTE ADULT - CARE PROVIDER_API CALL
Edna Aguilar), Internal Medicine; Rheumatology  [address] [place] [address]  Phone: [phone]  Fax: [phone]

## 2017-07-13 NOTE — DISCHARGE NOTE ADULT - CARE PLAN
Principal Discharge DX:	Pneumonia  Goal:	resolution  Instructions for follow-up, activity and diet:	Complete 4 more days of Augmentin as prescribed for possible aspiration pneumonia.  Keep head of bed above 30 degrees to lessen chances of aspiration.  Secondary Diagnosis:	Fistula, intestinovesical  Instructions for follow-up, activity and diet:	Family decided against surgical intervention given risks of surgery. Pt will have fecal matter that could drain from the urinary tract.  Oxycodone 5mg every 4 hours standing , and additional 5mg q4h PRN oxycodone if pt having signs of pain or respiratory distress. Follow hospice care recommendations regarding keeping the pt comfortable.  Secondary Diagnosis:	Dysphagia  Instructions for follow-up, activity and diet:	Continue the Vital 1.5 tube feeds with Dl supplementation that pt was on in the facility prior to hospitalization.  Head of bed elevation above 30 degrees, to lessen chance of aspiration.  Secondary Diagnosis:	Functional quadriplegia  Instructions for follow-up, activity and diet:	Nursing care with all activities of daily living.  Secondary Diagnosis:	Parkinson disease  Instructions for follow-up, activity and diet:	Continue sinemet  Secondary Diagnosis:	PEG tube malfunction  Instructions for follow-up, activity and diet:	PEG was replaced. Family wishes to continue tube feeds for now.  Secondary Diagnosis:	Decubitus ulcer of buttock, stage 4  Instructions for follow-up, activity and diet:	Continue Dl supplements with tube feeds.  Keep pressure off the ulcer if possible.  Every other day wound care: cleanse the wound with saline, pack gently with iodosorb packing strip and cover with a 4x4 gauze.

## 2017-07-13 NOTE — PROGRESS NOTE ADULT - SUBJECTIVE AND OBJECTIVE BOX
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      will d/w family about surgical options if agreeable 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  Will sign off at this time   please call if needed

## 2017-07-13 NOTE — DISCHARGE NOTE ADULT - MEDICATION SUMMARY - MEDICATIONS TO TAKE
I will START or STAY ON the medications listed below when I get home from the hospital:    oxyCODONE 5 mg oral tablet  -- 1 tab(s) by gastrostomy tube every 4 hours  -- Indication: For Pain    oxyCODONE 5 mg oral tablet  -- 1 tab(s) by gastrostomy tube every 4 hours, As Needed for appearance of pain in between the standing doses of oxycodone that pt is getting every 4 hours OR for shortness of breath  -- Indication: For Pain or shortness of breath as needed    acetaminophen 160 mg/5 mL oral suspension  -- 20 milliliter(s) by gastrostomy tube every 6 hours, As Needed -For Temp greater than 38 C (100.4 F)  -- Indication: For For fever if needed    carbidopa-levodopa 25 mg-100 mg oral tablet  -- 1 tab(s) by gastrostomy tube 2 times a day  -- Indication: For Parkinson disease    amLODIPine 5 mg oral tablet  -- 1 tab(s) by gastrostomy tube once a day  -- Hold for SBP<130  -- Indication: For Hypertension    docusate sodium 10 mg/mL oral liquid  -- 10 milliliter(s) by gastrostomy tube 2 times a day  -- Indication: For Constipation    potassium chloride 20 mEq oral powder for reconstitution  -- 1 packet(s) by gastrostomy tube once a day  -- Indication: For Potassium supplement    amoxicillin-clavulanate 400 mg-57 mg/5 mL oral powder for reconstitution  -- 10 milliliter(s) by gastrostomy tube every 12 hours for 4 more days (stop after doses on 07/17/17)  -- Expires___________________  Finish all this medication unless otherwise directed by prescriber.  Refrigerate and shake well.  Expires_______________________  Take with food or milk.    -- Indication: For Pneumonia    pantoprazole 40 mg oral granule, enteric coated  --  by gastrostomy tube once a day  -- Indication: For To reduce stomach acid    levothyroxine 50 mcg (0.05 mg) oral tablet  -- 1 tab(s) by gastrostomy tube once a day  -- Indication: For Hypothyroidism    Multiple Vitamins oral liquid  -- 5 milligram(s) by gastrostomy tube once a day  -- Indication: For vitamin supplement

## 2017-07-13 NOTE — PROGRESS NOTE ADULT - PROBLEM SELECTOR PLAN 2
as per surgery recs
perhaps has aspiration PNA given the infiltrates seen on CT Chest  -not particularly toxic appearing, just chronically ill; c/w zosyn for now  -f/up cultures  -the zosyn will also cover potentially GI jumana that may be causing infection in the urinary tract from the fistula
as per surgery recs
perhaps has aspiration PNA given the infiltrates seen on CT Chest  -not particularly toxic appearing; will d/c vanco; keep zosyn for now  -f/up cultures  -the zosyn will also cover potentially GI jumana that may be causing infection in the urinary tract from the fistula

## 2017-07-13 NOTE — DISCHARGE NOTE ADULT - SECONDARY DIAGNOSIS.
Fistula, intestinovesical Dysphagia Functional quadriplegia Parkinson disease PEG tube malfunction Decubitus ulcer of buttock, stage 4

## 2017-07-13 NOTE — DISCHARGE NOTE ADULT - ADDITIONAL INSTRUCTIONS
Every other day wound care: cleanse the right buttock wound with saline, pack gently with iodosorb packing strip and cover with a 4x4 gauze.

## 2017-07-16 LAB
CULTURE RESULTS: SIGNIFICANT CHANGE UP
SPECIMEN SOURCE: SIGNIFICANT CHANGE UP

## 2017-07-17 DIAGNOSIS — E87.1 HYPO-OSMOLALITY AND HYPONATREMIA: ICD-10-CM

## 2017-07-17 DIAGNOSIS — N32.1 VESICOINTESTINAL FISTULA: ICD-10-CM

## 2017-07-17 DIAGNOSIS — R13.10 DYSPHAGIA, UNSPECIFIED: ICD-10-CM

## 2017-07-17 DIAGNOSIS — J69.0 PNEUMONITIS DUE TO INHALATION OF FOOD AND VOMIT: ICD-10-CM

## 2017-07-17 DIAGNOSIS — R53.2 FUNCTIONAL QUADRIPLEGIA: ICD-10-CM

## 2017-07-17 DIAGNOSIS — Z51.5 ENCOUNTER FOR PALLIATIVE CARE: ICD-10-CM

## 2017-07-17 DIAGNOSIS — L89.314 PRESSURE ULCER OF RIGHT BUTTOCK, STAGE 4: ICD-10-CM

## 2017-07-17 DIAGNOSIS — K56.41 FECAL IMPACTION: ICD-10-CM

## 2017-07-17 DIAGNOSIS — Z66 DO NOT RESUSCITATE: ICD-10-CM

## 2017-07-17 DIAGNOSIS — Z74.01 BED CONFINEMENT STATUS: ICD-10-CM

## 2017-07-17 DIAGNOSIS — I69.920 APHASIA FOLLOWING UNSPECIFIED CEREBROVASCULAR DISEASE: ICD-10-CM

## 2017-07-17 DIAGNOSIS — K94.23 GASTROSTOMY MALFUNCTION: ICD-10-CM

## 2023-07-17 NOTE — DISCHARGE NOTE ADULT - FUNCTIONAL SCREEN CURRENT LEVEL: DRESSING, MLM
[Normal Mood and Affect] : normal mood and affect [Able to Communicate] : able to communicate [Well Developed] : well developed [Well Nourished] : well nourished (4) completely dependent [NL (0)] : extension 0 degrees [5___] : hamstring 5[unfilled]/5 [Right] : right knee [AP] : anteroposterior [Lateral] : lateral [[place]] : [place]yline [Degenerative change] : Degenerative change [] : negative Lachmann [FreeTextEntry3] : small superficial abrasion over patella, clean and dry [FreeTextEntry9] : Mild medial narrowing.  Calcified popliteal artery. [TWNoteComboBox7] : flexion 120 degrees